# Patient Record
Sex: FEMALE | NOT HISPANIC OR LATINO | Employment: OTHER | URBAN - METROPOLITAN AREA
[De-identification: names, ages, dates, MRNs, and addresses within clinical notes are randomized per-mention and may not be internally consistent; named-entity substitution may affect disease eponyms.]

---

## 2019-04-17 ENCOUNTER — OFFICE VISIT (OUTPATIENT)
Dept: OBGYN | Facility: CLINIC | Age: 79
End: 2019-04-17
Payer: MEDICARE

## 2019-04-17 DIAGNOSIS — Z53.9 ERRONEOUS ENCOUNTER--DISREGARD: Primary | ICD-10-CM

## 2019-05-01 ENCOUNTER — OFFICE VISIT (OUTPATIENT)
Dept: FAMILY MEDICINE | Facility: CLINIC | Age: 79
End: 2019-05-01
Attending: FAMILY MEDICINE
Payer: MEDICARE

## 2019-05-01 VITALS
DIASTOLIC BLOOD PRESSURE: 85 MMHG | HEIGHT: 62 IN | BODY MASS INDEX: 26.81 KG/M2 | WEIGHT: 145.7 LBS | SYSTOLIC BLOOD PRESSURE: 151 MMHG | HEART RATE: 75 BPM

## 2019-05-01 DIAGNOSIS — E55.9 VITAMIN D DEFICIENCY: ICD-10-CM

## 2019-05-01 DIAGNOSIS — E78.00 HYPERCHOLESTEROLEMIA: ICD-10-CM

## 2019-05-01 DIAGNOSIS — R73.03 PREDIABETES: Primary | ICD-10-CM

## 2019-05-01 DIAGNOSIS — M47.26 OSTEOARTHRITIS OF SPINE WITH RADICULOPATHY, LUMBAR REGION: ICD-10-CM

## 2019-05-01 PROCEDURE — G0463 HOSPITAL OUTPT CLINIC VISIT: HCPCS | Mod: ZF

## 2019-05-01 RX ORDER — ATORVASTATIN CALCIUM 20 MG/1
20 TABLET, FILM COATED ORAL DAILY
Qty: 30 TABLET | Refills: 3 | Status: SHIPPED | OUTPATIENT
Start: 2019-05-01 | End: 2019-09-25

## 2019-05-01 RX ORDER — ATORVASTATIN CALCIUM 20 MG/1
20 TABLET, FILM COATED ORAL
COMMUNITY
Start: 2018-12-11 | End: 2019-06-26

## 2019-05-01 ASSESSMENT — MIFFLIN-ST. JEOR: SCORE: 1079.14

## 2019-05-01 NOTE — LETTER
Date:May 14, 2019      Patient was self referred, no letter generated. Do not send.        [facility] Information

## 2019-05-01 NOTE — PROGRESS NOTES
"Pt. Here to establish care    Seen with --(arrived late)--her insurance changed and could not be seen at former clinic  She has not been taking her medication since she was last seen there 3 months ago.  Counseled patient that can only address 3 concerns today and can continue on other health issues at next visit.      1. Pain in R leg  X 4 years, has seen many doctors and had multiple imaging in past, much of which has been [place] of state.  Pain starts  around R  Hips and radiates down thigh and leg to calf.  Increasingly,  cannot feel patch  on front of calf.   Describes pain as \"itching, poking, squeezing\" tingling? She has R leg weakness as well  She has low back pain, unclear if R leg pain starts at low back.   No pain with laying flat, worse with sitting, worse with walking on hard surface  Had same pain when was pregnant, and after childbirth, had difficulty lifting the right leg, gradually improved over time    Around time pain started had MRI back in [place]  Has had X-ray of back at ER in [place], records not available  Appears to have done PT with [facility] in 2018    2. Pain in both shoulders x years, getting worse-Connnective tissue disease labs (KERRI, CCP, negative per [facility] records)    3. Headaches x 6-7 years--had been on unknown medication in past   No change from previous HA's    Chronic medical problems  Hypercholesterol--had been on Lipitor per [facility] records  Elevated blood pressure without diagnosis of HTN--variable bp on review of [facility] records  PreDM with HgbA1 5.7, appears to have been referred to nutrition  Vit D deficinecy by record    Social History     Socioeconomic History     Marital status: Single     Spouse name: Not on file     Number of children: Not on file     Years of education: Not on file     Highest education level: Not on file   Occupational History     Not on file   Social Needs     Financial resource strain: Not on file     Food insecurity:     Worry: Not on file     Inability: Not " "on file     Transportation needs:     Medical: Not on file     Non-medical: Not on file   Tobacco Use     Smoking status: Not on file   Substance and Sexual Activity     Alcohol use: Not on file     Drug use: Not on file     Sexual activity: Not on file   Lifestyle     Physical activity:     Days per week: Not on file     Minutes per session: Not on file     Stress: Not on file   Relationships     Social connections:     Talks on phone: Not on file     Gets together: Not on file     Attends Zoroastrianism service: Not on file     Active member of club or organization: Not on file     Attends meetings of clubs or organizations: Not on file     Relationship status: Not on file     Intimate partner violence:     Fear of current or ex partner: Not on file     Emotionally abused: Not on file     Physically abused: Not on file     Forced sexual activity: Not on file   Other Topics Concern     Not on file   Social History Narrative     Not on file       ROS  As noted above    PE  /85 (BP Location: Right arm, Patient Position: Chair)   Pulse 75   Ht 1.575 m (5' 2\")   Wt 66.1 kg (145 lb 11.2 oz)   BMI 26.65 kg/m    GENERAL: no apparent distress  CARDIAC: regular rate and rhythm, no murmur  RESP: clear, no wheezing, no rales, no rhonchi  EXT--no edema  DTR 2/4 L patella, 1/4 R patella    Strength R hip flexor 5/5, L hip flexor 5/5  Lower leg R  5-/5 flex and ext, 5/5 for both L   dorsiflexion R foot 4-/5, plantarflexion 4/5, decreased strength with push to medial and lateral  Normal above on L foot    A/P  1. R leg pain, with weakness and loss sensatoin  2. Low back pain  Most likely radiculopathy, possible spinal stenosis  Will get MRI and refer to orthopedics vs PT depending on results    3 Hypercholesterol--restart Lipitor 20 mg  4. PreDM--repeat HgbA1c  5. HTN--CMP, will not start medication at this time given age and possible fall risk. Will consider if bp elevation persists at this level and gain better " understanding of home situatoin  6. Vit D deficiency--repeat level      Follow-up 1 month

## 2019-05-01 NOTE — LETTER
"5/1/2019       RE: Abdoulaye Peguero  [address]  [place] [address]     Dear Colleague,    Thank you for referring your patient, Abdoulaye Peguero, to the [facility] at [facility]. Please see a copy of my visit note below.    Pt. Here to establish care    Seen with --(arrived late)--her insurance changed and could not be seen at former clinic  She has not been taking her medication since she was last seen there 3 months ago.  Counseled patient that can only address 3 concerns today and can continue on other health issues at next visit.      1. Pain in R leg  X 4 years, has seen many doctors and had multiple imaging in past, much of which has been out of state.  Pain starts  around R  Hips and radiates down thigh and leg to calf.  Increasingly,  cannot feel patch  on front of calf.   Describes pain as \"itching, poking, squeezing\" tingling? She has R leg weakness as well  She has low back pain, unclear if R leg pain starts at low back.   No pain with laying flat, worse with sitting, worse with walking on hard surface  Had same pain when was pregnant, and after childbirth, had difficulty lifting the right leg, gradually improved over time    Around time pain started had [place] back in [place]  Has had X-ray of back at ER in [place], records not available  Appears to have done PT with [facility] in 2018    2. Pain in both shoulders x years, getting worse-Connnective tissue disease labs (KERRI, CCP, negative per [facility] records)    3. Headaches x 6-7 years--had been on unknown medication in past   No change from previous HA's    Chronic medical problems  Hypercholesterol--had been on Lipitor per [facility] records  Elevated blood pressure without diagnosis of HTN--variable bp on review of [facility] records  PreDM with HgbA1 5.7, appears to have been referred to nutrition  Vit D deficinecy by record    Social History     Socioeconomic History     Marital status: Single     Spouse " "name: Not on file     Number of children: Not on file     Years of education: Not on file     Highest education level: Not on file   Occupational History     Not on file   Social Needs     Financial resource strain: Not on file     Food insecurity:     Worry: Not on file     Inability: Not on file     Transportation needs:     Medical: Not on file     Non-medical: Not on file   Tobacco Use     Smoking status: Not on file   Substance and Sexual Activity     Alcohol use: Not on file     Drug use: Not on file     Sexual activity: Not on file   Lifestyle     Physical activity:     Days per week: Not on file     Minutes per session: Not on file     Stress: Not on file   Relationships     Social connections:     Talks on phone: Not on file     Gets together: Not on file     Attends Holiness service: Not on file     Active member of club or organization: Not on file     Attends meetings of clubs or organizations: Not on file     Relationship status: Not on file     Intimate partner violence:     Fear of current or ex partner: Not on file     Emotionally abused: Not on file     Physically abused: Not on file     Forced sexual activity: Not on file   Other Topics Concern     Not on file   Social History Narrative     Not on file       ROS  As noted above    PE  /85 (BP Location: Right arm, Patient Position: Chair)   Pulse 75   Ht 1.575 m (5' 2\")   Wt 66.1 kg (145 lb 11.2 oz)   BMI 26.65 kg/m     GENERAL: no apparent distress  CARDIAC: regular rate and rhythm, no murmur  RESP: clear, no wheezing, no rales, no rhonchi  EXT--no edema  DTR 2/4 L patella, 1/4 R patella    Strength R hip flexor 5/5, L hip flexor 5/5  Lower leg R  5-/5 flex and ext, 5/5 for both L   dorsiflexion R foot 4-/5, plantarflexion 4/5, decreased strength with push to medial and lateral  Normal above on L foot    A/P  1. R leg pain, with weakness and loss sensatoin  2. Low back pain  Most likely radiculopathy, possible spinal stenosis  Will get " MRI and refer to orthopedics vs PT depending on results    3 Hypercholesterol--restart Lipitor 20 mg  4. PreDM--repeat HgbA1c  5. HTN--CMP, will not start medication at this time given age and possible fall risk. Will consider if bp elevation persists at this level and gain better understanding of home situatoin  6. Vit D deficiency--repeat level      Follow-up 1 month      Again, thank you for allowing me to participate in the care of your patient.      Sincerely,    Sai Mcallister MD

## 2019-05-07 ENCOUNTER — HOSPITAL ENCOUNTER (OUTPATIENT)
Dept: MRI IMAGING | Facility: CLINIC | Age: 79
Discharge: HOME | End: 2019-05-07
Attending: FAMILY MEDICINE | Admitting: FAMILY MEDICINE
Payer: MEDICARE

## 2019-05-07 DIAGNOSIS — E78.00 HYPERCHOLESTEROLEMIA: ICD-10-CM

## 2019-05-07 DIAGNOSIS — M47.26 OSTEOARTHRITIS OF SPINE WITH RADICULOPATHY, LUMBAR REGION: ICD-10-CM

## 2019-05-07 DIAGNOSIS — R73.03 PREDIABETES: ICD-10-CM

## 2019-05-07 DIAGNOSIS — E55.9 VITAMIN D DEFICIENCY: ICD-10-CM

## 2019-05-07 LAB
ALBUMIN SERPL-MCNC: 3.5 G/DL (ref 3.4–5)
ALP SERPL-CCNC: 80 U/L (ref 40–150)
ALT SERPL W P-5'-P-CCNC: 27 U/L (ref 0–50)
ANION GAP SERPL CALCULATED.3IONS-SCNC: 7 MMOL/L (ref 3–14)
AST SERPL W P-5'-P-CCNC: 39 U/L (ref 0–45)
BILIRUB SERPL-MCNC: 0.2 MG/DL (ref 0.2–1.3)
BUN SERPL-MCNC: 14 MG/DL (ref 7–30)
CALCIUM SERPL-MCNC: 8.8 MG/DL (ref 8.5–10.1)
CHLORIDE SERPL-SCNC: 110 MMOL/L (ref 94–109)
CO2 SERPL-SCNC: 25 MMOL/L (ref 20–32)
CREAT SERPL-MCNC: 0.52 MG/DL (ref 0.52–1.04)
GFR SERPL CREATININE-BSD FRML MDRD: 89 ML/MIN/{1.73_M2}
GLUCOSE SERPL-MCNC: 102 MG/DL (ref 70–99)
HBA1C MFR BLD: 5.5 % (ref 0–5.6)
POTASSIUM SERPL-SCNC: 4.1 MMOL/L (ref 3.4–5.3)
PROT SERPL-MCNC: 7.6 G/DL (ref 6.8–8.8)
SODIUM SERPL-SCNC: 142 MMOL/L (ref 133–144)

## 2019-05-07 PROCEDURE — 80053 COMPREHEN METABOLIC PANEL: CPT | Performed by: FAMILY MEDICINE

## 2019-05-07 PROCEDURE — 72148 MRI LUMBAR SPINE W/O DYE: CPT

## 2019-05-07 PROCEDURE — 82306 VITAMIN D 25 HYDROXY: CPT | Performed by: FAMILY MEDICINE

## 2019-05-07 PROCEDURE — 83036 HEMOGLOBIN GLYCOSYLATED A1C: CPT | Performed by: FAMILY MEDICINE

## 2019-05-07 PROCEDURE — 36415 COLL VENOUS BLD VENIPUNCTURE: CPT | Performed by: FAMILY MEDICINE

## 2019-05-10 DIAGNOSIS — M47.26 OSTEOARTHRITIS OF SPINE WITH RADICULOPATHY, LUMBAR REGION: ICD-10-CM

## 2019-05-10 DIAGNOSIS — E55.9 VITAMIN D DEFICIENCY: Primary | ICD-10-CM

## 2019-05-10 PROBLEM — R73.03 PREDIABETES: Status: ACTIVE | Noted: 2018-02-27

## 2019-05-10 PROBLEM — R52 BODY ACHES: Status: ACTIVE | Noted: 2018-02-19

## 2019-05-10 PROBLEM — R32 URINARY INCONTINENCE: Status: ACTIVE | Noted: 2018-03-28

## 2019-05-10 PROBLEM — E78.5 HYPERLIPIDEMIA: Status: ACTIVE | Noted: 2018-04-04

## 2019-05-10 PROBLEM — K21.9 GASTROESOPHAGEAL REFLUX DISEASE: Status: ACTIVE | Noted: 2018-02-19

## 2019-05-10 LAB
DEPRECATED CALCIDIOL+CALCIFEROL SERPL-MC: 22 UG/L (ref 20–75)
VITAMIN D2 SERPL-MCNC: 6 UG/L
VITAMIN D3 SERPL-MCNC: 16 UG/L

## 2019-05-10 RX ORDER — ERGOCALCIFEROL 1.25 MG/1
50000 CAPSULE, LIQUID FILLED ORAL WEEKLY
Qty: 8 CAPSULE | Refills: 0 | Status: SHIPPED | OUTPATIENT
Start: 2019-05-10 | End: 2019-07-31

## 2019-05-10 NOTE — RESULT ENCOUNTER NOTE
Please call with results. Pt. Does not speak English--speaks [place].  MRI shows arthritis in her lower (lumbar spine) and changes are pressing on nerves which can affect her legs. I will refer her to orthopedist. Please help get this referral set up for her. Also is vitamin D deficient--I sent in prescription for Vitamin d, 1 tab WEEKLY for 8 weeks.      Sai Mcallister MD

## 2019-06-04 NOTE — TELEPHONE ENCOUNTER
RECORDS RECEIVED FROM: Osteoarthritis of spine, imaging in system, per pt and . Referred by Sai Mcallister MD   DATE RECEIVED: Jun 11, 2019    NOTES STATUS DETAILS   OFFICE NOTE from referring provider Internal Dr. Mcallister 5/1/19   OFFICE NOTE from other specialist N/A    DISCHARGE SUMMARY from hospital N/A    DISCHARGE REPORT from the ER N/A    OPERATIVE REPORT N/A    MEDICATION LIST Internal    IMPLANT RECORD/STICKER N/A    LABS     CBC/DIFF N/A    CULTURES N/A    INJECTIONS DONE IN RADIOLOGY N/A    MRI Internal 5/7/19   CT SCAN N/A    XRAYS (IMAGES & REPORTS) N/A    TUMOR     PATHOLOGY  Slides & report N/A

## 2019-06-11 ENCOUNTER — PRE VISIT (OUTPATIENT)
Dept: ORTHOPEDICS | Facility: CLINIC | Age: 79
End: 2019-06-11

## 2019-06-11 ENCOUNTER — ANCILLARY PROCEDURE (OUTPATIENT)
Dept: GENERAL RADIOLOGY | Facility: CLINIC | Age: 79
End: 2019-06-11
Attending: PREVENTIVE MEDICINE
Payer: MEDICARE

## 2019-06-11 ENCOUNTER — OFFICE VISIT (OUTPATIENT)
Dept: ORTHOPEDICS | Facility: CLINIC | Age: 79
End: 2019-06-11
Attending: FAMILY MEDICINE
Payer: MEDICARE

## 2019-06-11 DIAGNOSIS — M50.30 DDD (DEGENERATIVE DISC DISEASE), CERVICAL: Primary | ICD-10-CM

## 2019-06-11 DIAGNOSIS — M50.30 DDD (DEGENERATIVE DISC DISEASE), CERVICAL: ICD-10-CM

## 2019-06-11 DIAGNOSIS — M51.369 DDD (DEGENERATIVE DISC DISEASE), LUMBAR: ICD-10-CM

## 2019-06-11 RX ORDER — METHYLPREDNISOLONE 4 MG
TABLET, DOSE PACK ORAL
Qty: 21 TABLET | Refills: 0 | Status: SHIPPED | OUTPATIENT
Start: 2019-06-11 | End: 2019-06-26

## 2019-06-11 NOTE — PROGRESS NOTES
HISTORY OF PRESENT ILLNESS  Ms. Peguero is a pleasant 79 year old year old female who presents to clinic today with bilateral shoulder pain and right arm pain and neck pain that has occurred for 3 years   And low back pain that radiates into both legs for the past few months  On/off  Would like do something now about the neck    MEDICAL HISTORY  Patient Active Problem List   Diagnosis     Chronic headache     Gastroesophageal reflux disease     Hyperlipidemia     Prediabetes     Urinary incontinence     Vitamin D deficiency     Body aches       Current Outpatient Medications   Medication Sig Dispense Refill     atorvastatin (LIPITOR) 20 MG tablet Take 20 mg by mouth       atorvastatin (LIPITOR) 20 MG tablet Take 1 tablet (20 mg) by mouth daily 30 tablet 3     vitamin D2 (ERGOCALCIFEROL) 71334 units (1250 mcg) capsule Take 1 capsule (50,000 Units) by mouth once a week for 8 doses 8 capsule 0       No Known Allergies    No family history on file.    Additional medical/Social/Surgical histories reviewed in [facility] and updated as appropriate.     REVIEW OF SYSTEMS (6/11/2019)  10 point ROS of systems including Constitutional, Eyes, Respiratory, Cardiovascular, Gastroenterology, Genitourinary, Integumentary, Musculoskeletal, Psychiatric were all negative except for pertinent positives noted in my HPI.     PHYSICAL EXAM  VSS, reviewed  Vital Signs: There were no vitals taken for this visit. Patient declined being weighed. There is no height or weight on file to calculate BMI.    General  - normal appearance, in no obvious distress  CV  - normal peripheral perfusion  Pulm  - normal respiratory pattern, non-labored  Musculoskeletal - lumbar spine  - stance: normal gait without limp, no obvious leg length discrepancy, normal heel and toe walk  - inspection: normal bone and joint alignment, no obvious scoliosis  - palpation: no paravertebral or bony tenderness  - ROM: flexion exacerbates pain, normal extension, sidebending,  rotation  - strength: lower extremities 5/5 in all planes  - special tests:  (-) straight leg raise  (-) slump test  Neuro  - patellar and Achilles DTRs 2+ bilaterally, NO lower extremity sensory deficit throughout L5 distribution, grossly normal coordination, normal muscle tone  Skin  - no ecchymosis, erythema, warmth, or induration, no obvious rash  Psych  - interactive, appropriate, normal mood and affect  Cervical: has pain with flexion and extension in neck  Has some radiation into arms/shoulders  ASSESSMENT & PLAN  78 yo female with cervical ddd, lumbar ddd  Reviewed xrays lumbar and cervical :shows ddd  Ordered cervical MRI  Will consider KIM  Cont. Medrol and tizanadine PRN        Jarrod Yadav MD, CAQSM

## 2019-06-11 NOTE — LETTER
6/11/2019       RE: Abdoulaye ePguero  [address]  [place] [address]     Dear Colleague,    Thank you for referring your patient, Abdoulaye Peguero, to the [facility] at [facility]. Please see a copy of my visit note below.    HISTORY OF PRESENT ILLNESS  Ms. Peguero is a pleasant 79 year old year old female who presents to clinic today with bilateral shoulder pain and right arm pain and neck pain that has occurred for 3 years   And low back pain that radiates into both legs for the past few months  On/off  Would like do something now about the neck    MEDICAL HISTORY  Patient Active Problem List   Diagnosis     Chronic headache     Gastroesophageal reflux disease     Hyperlipidemia     Prediabetes     Urinary incontinence     Vitamin D deficiency     Body aches       Current Outpatient Medications   Medication Sig Dispense Refill     atorvastatin (LIPITOR) 20 MG tablet Take 20 mg by mouth       atorvastatin (LIPITOR) 20 MG tablet Take 1 tablet (20 mg) by mouth daily 30 tablet 3     vitamin D2 (ERGOCALCIFEROL) 21460 units (1250 mcg) capsule Take 1 capsule (50,000 Units) by mouth once a week for 8 doses 8 capsule 0       No Known Allergies    No family history on file.    Additional medical/Social/Surgical histories reviewed in [employer] and updated as appropriate.     REVIEW OF SYSTEMS (6/11/2019)  10 point ROS of systems including Constitutional, Eyes, Respiratory, Cardiovascular, Gastroenterology, Genitourinary, Integumentary, Musculoskeletal, Psychiatric were all negative except for pertinent positives noted in my HPI.     PHYSICAL EXAM  VSS, reviewed  Vital Signs: There were no vitals taken for this visit. Patient declined being weighed. There is no height or weight on file to calculate BMI.    General  - normal appearance, in no obvious distress  CV  - normal peripheral perfusion  Pulm  - normal respiratory pattern, non-labored  Musculoskeletal - lumbar spine  -  stance: normal gait without limp, no obvious leg length discrepancy, normal heel and toe walk  - inspection: normal bone and joint alignment, no obvious scoliosis  - palpation: no paravertebral or bony tenderness  - ROM: flexion exacerbates pain, normal extension, sidebending, rotation  - strength: lower extremities 5/5 in all planes  - special tests:  (-) straight leg raise  (-) slump test  Neuro  - patellar and Achilles DTRs 2+ bilaterally, NO lower extremity sensory deficit throughout L5 distribution, grossly normal coordination, normal muscle tone  Skin  - no ecchymosis, erythema, warmth, or induration, no obvious rash  Psych  - interactive, appropriate, normal mood and affect  Cervical: has pain with flexion and extension in neck  Has some radiation into arms/shoulders  ASSESSMENT & PLAN  78 yo female with cervical ddd, lumbar ddd  Reviewed xrays lumbar and cervical :shows ddd  Ordered cervical MRI  Will consider KIM  Cont. Medrol and tizanadine PRN    Jarrod Yadav MD, CAQSM

## 2019-06-11 NOTE — NURSING NOTE
Reason For Visit:   Chief Complaint   Patient presents with     Spine - Pain, Eval/Assessment       There were no vitals taken for this visit.    Pain Assessment  Patient Currently in Pain: Yes  0-10 Pain Scale: 5  Primary Pain Location: Back(mid)  Other Pain Locations: both legs    Ingrid Armenta, ATC

## 2019-06-24 ENCOUNTER — ANCILLARY PROCEDURE (OUTPATIENT)
Dept: MRI IMAGING | Facility: CLINIC | Age: 79
End: 2019-06-24
Attending: PREVENTIVE MEDICINE
Payer: MEDICARE

## 2019-06-24 DIAGNOSIS — M50.30 DDD (DEGENERATIVE DISC DISEASE), CERVICAL: ICD-10-CM

## 2019-06-26 ENCOUNTER — OFFICE VISIT (OUTPATIENT)
Dept: FAMILY MEDICINE | Facility: CLINIC | Age: 79
End: 2019-06-26
Attending: FAMILY MEDICINE
Payer: MEDICARE

## 2019-06-26 VITALS
WEIGHT: 146 LBS | SYSTOLIC BLOOD PRESSURE: 124 MMHG | HEART RATE: 78 BPM | DIASTOLIC BLOOD PRESSURE: 79 MMHG | BODY MASS INDEX: 26.7 KG/M2

## 2019-06-26 DIAGNOSIS — Z11.1 SCREENING EXAMINATION FOR PULMONARY TUBERCULOSIS: ICD-10-CM

## 2019-06-26 DIAGNOSIS — M51.369 DDD (DEGENERATIVE DISC DISEASE), LUMBAR: ICD-10-CM

## 2019-06-26 DIAGNOSIS — M47.9 DEGENERATIVE JOINT DISEASE OF LOW BACK: ICD-10-CM

## 2019-06-26 DIAGNOSIS — E55.9 VITAMIN D DEFICIENCY: ICD-10-CM

## 2019-06-26 DIAGNOSIS — F32.A DEPRESSION, UNSPECIFIED DEPRESSION TYPE: Primary | ICD-10-CM

## 2019-06-26 DIAGNOSIS — E78.00 HYPERCHOLESTEROLEMIA: ICD-10-CM

## 2019-06-26 DIAGNOSIS — M47.25 OSTEOARTHRITIS OF SPINE WITH RADICULOPATHY, THORACOLUMBAR REGION: ICD-10-CM

## 2019-06-26 DIAGNOSIS — K21.9 GASTROESOPHAGEAL REFLUX DISEASE, ESOPHAGITIS PRESENCE NOT SPECIFIED: ICD-10-CM

## 2019-06-26 DIAGNOSIS — M48.062 SPINAL STENOSIS OF LUMBAR REGION WITH NEUROGENIC CLAUDICATION: ICD-10-CM

## 2019-06-26 LAB
CHOLEST SERPL-MCNC: 227 MG/DL
ERYTHROCYTE [DISTWIDTH] IN BLOOD BY AUTOMATED COUNT: 12.7 % (ref 10–15)
HCT VFR BLD AUTO: 36.7 % (ref 35–47)
HDLC SERPL-MCNC: 61 MG/DL
HGB BLD-MCNC: 11.5 G/DL (ref 11.7–15.7)
LDLC SERPL CALC-MCNC: 131 MG/DL
MCH RBC QN AUTO: 30.3 PG (ref 26.5–33)
MCHC RBC AUTO-ENTMCNC: 31.3 G/DL (ref 31.5–36.5)
MCV RBC AUTO: 97 FL (ref 78–100)
NONHDLC SERPL-MCNC: 166 MG/DL
PLATELET # BLD AUTO: 275 10E9/L (ref 150–450)
RBC # BLD AUTO: 3.8 10E12/L (ref 3.8–5.2)
TRIGL SERPL-MCNC: 175 MG/DL
TSH SERPL DL<=0.005 MIU/L-ACNC: 0.68 MU/L (ref 0.4–4)
WBC # BLD AUTO: 8.2 10E9/L (ref 4–11)

## 2019-06-26 PROCEDURE — 84443 ASSAY THYROID STIM HORMONE: CPT | Performed by: FAMILY MEDICINE

## 2019-06-26 PROCEDURE — 36415 COLL VENOUS BLD VENIPUNCTURE: CPT | Performed by: FAMILY MEDICINE

## 2019-06-26 PROCEDURE — 85027 COMPLETE CBC AUTOMATED: CPT | Performed by: FAMILY MEDICINE

## 2019-06-26 PROCEDURE — G0463 HOSPITAL OUTPT CLINIC VISIT: HCPCS | Mod: ZF

## 2019-06-26 PROCEDURE — 86481 TB AG RESPONSE T-CELL SUSP: CPT | Performed by: FAMILY MEDICINE

## 2019-06-26 PROCEDURE — 80061 LIPID PANEL: CPT | Performed by: FAMILY MEDICINE

## 2019-06-26 RX ORDER — SENNOSIDES 8.6 MG
650 CAPSULE ORAL 3 TIMES DAILY
Qty: 90 TABLET | Refills: 11 | Status: SHIPPED | OUTPATIENT
Start: 2019-06-26 | End: 2020-08-26

## 2019-06-26 RX ORDER — CHOLECALCIFEROL (VITAMIN D3) 50 MCG
1 TABLET ORAL DAILY
Qty: 90 TABLET | Refills: 3 | Status: SHIPPED | OUTPATIENT
Start: 2019-06-26 | End: 2022-02-04

## 2019-06-26 SDOH — HEALTH STABILITY: MENTAL HEALTH: HOW OFTEN DO YOU HAVE A DRINK CONTAINING ALCOHOL?: NEVER

## 2019-06-26 SDOH — HEALTH STABILITY: MENTAL HEALTH: HOW OFTEN DO YOU HAVE 6 OR MORE DRINKS ON ONE OCCASION?: NEVER

## 2019-06-26 ASSESSMENT — PAIN SCALES - GENERAL: PAINLEVEL: NO PAIN (0)

## 2019-06-26 NOTE — LETTER
"6/26/2019       RE: Abdoulaye Peguero  [address]  [place] [address]     Dear Colleague,    Thank you for referring your patient, Abdoulaye Peguero, to the [facility] at [facility]. Please see a copy of my visit note below.    Pt. Here for continued care, multiple concerns  Seen with     1. Chronic back and neck pain  Saw Orthopedist on 6/11/2019. Cervical spine Xray and MRI cerical spine completed, consistent with degenerative disc disease, facet arthropathy and spinal stenosis . Orthopedic note not complete and patient has follow up 7/16    2.  Post prandial weakness: When eat , states feels weak and doesn't have any energy--will happen right away, food feels like sits in stomach. No nausea or vomiting, just weak. Lasts until has bm, \"until has digested\" Will last from lunch until evening. Have bm every other day  3. Decreeased vision, floaters has opthalmology appointment   4. Decreased hearing in L ear, had past injury or incident with doctor in past   5. Lipids--not taking lipitor, wanted to review labs would like repeat it before takes  6. Low Vitamin D: Reviewed labs w/patient--needs Vit D supplement  7. Will Be attending adult day care, need labs       Current Outpatient Medications   Medication     tiZANidine (ZANAFLEX) 4 MG tablet     vitamin D2 (ERGOCALCIFEROL) 84756 units (1250 mcg) capsule     atorvastatin (LIPITOR) 20 MG tablet     No current facility-administered medications for this visit.        SH  Social History     Socioeconomic History     Marital status: Single     Spouse name: Not on file     Number of children: Not on file     Years of education: Not on file     Highest education level: Not on file   Occupational History     Not on file   Social Needs     Financial resource strain: Not on file     Food insecurity:     Worry: Not on file     Inability: Not on file     Transportation needs:     Medical: Not on file     " Non-medical: Not on file   Tobacco Use     Smoking status: Never Smoker     Smokeless tobacco: Never Used   Substance and Sexual Activity     Alcohol use: Never     Frequency: Never     Binge frequency: Never     Drug use: Never     Sexual activity: Not Currently     Partners: Male     Birth control/protection: Post-menopausal   Lifestyle     Physical activity:     Days per week: Not on file     Minutes per session: Not on file     Stress: Not on file   Relationships     Social connections:     Talks on phone: Not on file     Gets together: Not on file     Attends Hoahaoism service: Not on file     Active member of club or organization: Not on file     Attends meetings of clubs or organizations: Not on file     Relationship status: Not on file     Intimate partner violence:     Fear of current or ex partner: Not on file     Emotionally abused: Not on file     Physically abused: Not on file     Forced sexual activity: Not on file   Other Topics Concern     Not on file   Social History Narrative     Not on file     Live alone  Brother, 2nd cousin, grandchildren in [facility]  Children live in different states  Someone comes to cook for pt., do laundry, clean house.      ROS  + Reflux  Dyspnea with exertion, any movement, eating  No chest pain, palpitations  +Gas  Mood: Sometimes nervous, sometimes happy, more easily angry  Occasional  issue with short term memory--can't remember where put phone, for example  12 point ROS negative except where noted above       PE:  Blood pressure 124/79, pulse 78, weight 66.2 kg (146 lb), not currently breastfeeding.    /79   Pulse 78   Wt 66.2 kg (146 lb)   Breastfeeding? No   BMI 26.70 kg/m     GENERAL: no apparent distress, speaks in full sentences  EYES: Conjunctiva are not injected, no discharge.  EARS: Left TM -no erythema, no effusion,  not bulged.               Right TM -no erythema, no effusion,  not bulged.  THROAT: no erythema, no exudate, no lesions  NECK:  "supple, no adenopathy.  CARDIAC: regular rate and rhythm, no murmur  RESP: clear, no wheezing, no rales, no rhonchi  ABD: soft, no distension, no tenderness, no masses, no liver or spleen enlargement  SKIN: No rashes    A/P  1. Chronic neck and back pain due to structural disease (DDD, formainal stenosis, OA)  Continue with orthopedics, start acetominphen  mg tid  2. Postprandial \"weakness\", early satiety  3. GERD  --start Prilosec 20 mg daily  --Consider GI referral, mesenteric ischemia?  4. Repeat lipids, restart lipitor if indicated  5. Blood pressure--currently normotensive,no need for medicatoin  6. Dyspnea--unclear if fatigue or dyspnea  Check CBC, TSH  Consider EKG,Echo  7. Need [facility] for adult day care  8. Mental health: counseled patient extensively regarding mood, treatment for low mood, including medication and therapy Patient denies SI. She is open to both treatments. Will refer to both therapy and psychiatry, given age would like input of psychiatrist.      Review immunization and cancer screening next visit  Follow-up 2-3 months      Again, thank you for allowing me to participate in the care of your patient.      Sincerely,    Sai Mcallister MD      "

## 2019-06-26 NOTE — LETTER
Date:July 8, 2019      Patient was self referred, no letter generated. Do not send.        [facility] Information

## 2019-06-26 NOTE — PROGRESS NOTES
"Pt. Here for continued care, multiple concerns  Seen with     1. Chronic back and neck pain  Saw Orthopedist on 6/11/2019. Cervical spine Xray and MRI cerical spine completed, consistent with degenerative disc disease, facet arthropathy and spinal stenosis . Orthopedic note not complete and patient has follow up 7/16    2.  Post prandial weakness: When eat , states feels weak and doesn't have any energy--will happen right away, food feels like sits in stomach. No nausea or vomiting, just weak. Lasts until has bm, \"until has digested\" Will last from lunch until evening. Have bm every other day  3. Decreeased vision, floaters has opthalmology appointment   4. Decreased hearing in L ear, had past injury or incident with doctor in past   5. Lipids--not taking lipitor, wanted to review labs would like repeat it before takes  6. Low Vitamin D: Reviewed labs w/patient--needs Vit D supplement  7. Will Be attending adult day care, need labs       Current Outpatient Medications   Medication     tiZANidine (ZANAFLEX) 4 MG tablet     vitamin D2 (ERGOCALCIFEROL) 70024 units (1250 mcg) capsule     atorvastatin (LIPITOR) 20 MG tablet     No current facility-administered medications for this visit.        SH  Social History     Socioeconomic History     Marital status: Single     Spouse name: Not on file     Number of children: Not on file     Years of education: Not on file     Highest education level: Not on file   Occupational History     Not on file   Social Needs     Financial resource strain: Not on file     Food insecurity:     Worry: Not on file     Inability: Not on file     Transportation needs:     Medical: Not on file     Non-medical: Not on file   Tobacco Use     Smoking status: Never Smoker     Smokeless tobacco: Never Used   Substance and Sexual Activity     Alcohol use: Never     Frequency: Never     Binge frequency: Never     Drug use: Never     Sexual activity: Not Currently     Partners: Male     Birth " control/protection: Post-menopausal   Lifestyle     Physical activity:     Days per week: Not on file     Minutes per session: Not on file     Stress: Not on file   Relationships     Social connections:     Talks on phone: Not on file     Gets together: Not on file     Attends Baptism service: Not on file     Active member of club or organization: Not on file     Attends meetings of clubs or organizations: Not on file     Relationship status: Not on file     Intimate partner violence:     Fear of current or ex partner: Not on file     Emotionally abused: Not on file     Physically abused: Not on file     Forced sexual activity: Not on file   Other Topics Concern     Not on file   Social History Narrative     Not on file     Live alone  Brother, 2nd cousin, grandchildren in [facility]  Children live in different states  Someone comes to cook for pt., do laundry, clean house.      ROS  + Reflux  Dyspnea with exertion, any movement, eating  No chest pain, palpitations  +Gas  Mood: Sometimes nervous, sometimes happy, more easily angry  Occasional  issue with short term memory--can't remember where put phone, for example  12 point ROS negative except where noted above       PE:  Blood pressure 124/79, pulse 78, weight 66.2 kg (146 lb), not currently breastfeeding.    /79   Pulse 78   Wt 66.2 kg (146 lb)   Breastfeeding? No   BMI 26.70 kg/m    GENERAL: no apparent distress, speaks in full sentences  EYES: Conjunctiva are not injected, no discharge.  EARS: Left TM -no erythema, no effusion,  not bulged.               Right TM -no erythema, no effusion,  not bulged.  THROAT: no erythema, no exudate, no lesions  NECK: supple, no adenopathy.  CARDIAC: regular rate and rhythm, no murmur  RESP: clear, no wheezing, no rales, no rhonchi  ABD: soft, no distension, no tenderness, no masses, no liver or spleen enlargement  SKIN: No rashes    A/P  1. Chronic neck and back pain due to structural disease (DDD, formainal  "stenosis, OA)  Continue with orthopedics, start acetominphen  mg tid  2. Postprandial \"weakness\", early satiety  3. GERD  --start Prilosec 20 mg daily  --Consider GI referral, mesenteric ischemia?  4. Repeat lipids, restart lipitor if indicated  5. Blood pressure--currently normotensive,no need for medicatoin  6. Dyspnea--unclear if fatigue or dyspnea  Check CBC, TSH  Consider EKG,Echo  7. Need [facility] for adult day care  8. Mental health: counseled patient extensively regarding mood, treatment for low mood, including medication and therapy Patient denies SI. She is open to both treatments. Will refer to both therapy and psychiatry, given age would like input of psychiatrist.      Review immunization and cancer screening next visit  Follow-up 2-3 months  "

## 2019-06-28 LAB
GAMMA INTERFERON BACKGROUND BLD IA-ACNC: 0.06 IU/ML
M TB IFN-G BLD-IMP: NEGATIVE
M TB IFN-G CD4+ BCKGRND COR BLD-ACNC: 9.54 IU/ML
MITOGEN IGNF BCKGRD COR BLD-ACNC: 0 IU/ML
MITOGEN IGNF BCKGRD COR BLD-ACNC: 0 IU/ML

## 2019-07-05 PROBLEM — M51.369 DDD (DEGENERATIVE DISC DISEASE), LUMBAR: Status: ACTIVE | Noted: 2019-07-05

## 2019-07-05 PROBLEM — M48.062 SPINAL STENOSIS OF LUMBAR REGION WITH NEUROGENIC CLAUDICATION: Status: ACTIVE | Noted: 2019-07-05

## 2019-07-05 PROBLEM — M47.25 OSTEOARTHRITIS OF SPINE WITH RADICULOPATHY, THORACOLUMBAR REGION: Status: ACTIVE | Noted: 2019-07-05

## 2019-07-05 PROBLEM — M48.062 SPINAL STENOSIS OF LUMBAR REGION WITH NEUROGENIC CLAUDICATION: Status: RESOLVED | Noted: 2019-07-05 | Resolved: 2019-07-05

## 2019-07-16 ENCOUNTER — OFFICE VISIT (OUTPATIENT)
Dept: ORTHOPEDICS | Facility: CLINIC | Age: 79
End: 2019-07-16
Payer: MEDICARE

## 2019-07-16 DIAGNOSIS — M51.369 DDD (DEGENERATIVE DISC DISEASE), LUMBAR: ICD-10-CM

## 2019-07-16 DIAGNOSIS — M50.30 DDD (DEGENERATIVE DISC DISEASE), CERVICAL: Primary | ICD-10-CM

## 2019-07-16 NOTE — NURSING NOTE
Reason For Visit:   Chief Complaint   Patient presents with     Spine - RECHECK       There were no vitals taken for this visit.    Pain Assessment  Patient Currently in Pain: Yes  0-10 Pain Scale: 5    Ingrid Armenta, ATC

## 2019-07-16 NOTE — PROGRESS NOTES
HISTORY OF PRESENT ILLNESS  Ms. Peguero is a pleasant 79 year old year old female who presents to clinic today for followup for cervical MRI  Doing ok  Feels a little better    MEDICAL HISTORY  Patient Active Problem List   Diagnosis     Chronic headache     Gastroesophageal reflux disease     Hyperlipidemia     Prediabetes     Urinary incontinence     Vitamin D deficiency     Body aches     DDD (degenerative disc disease), lumbar     Osteoarthritis of spine with radiculopathy, thoracolumbar region       Current Outpatient Medications   Medication Sig Dispense Refill     acetaminophen (TYLENOL) 650 MG CR tablet Take 1 tablet (650 mg) by mouth 3 times daily 90 tablet 11     atorvastatin (LIPITOR) 20 MG tablet Take 1 tablet (20 mg) by mouth daily 30 tablet 3     omeprazole (PRILOSEC) 20 MG DR capsule Take 1 capsule (20 mg) by mouth daily 90 capsule 1     tiZANidine (ZANAFLEX) 4 MG tablet Take 1-2 tablets (4-8 mg) by mouth nightly as needed 30 tablet 1     vitamin D3 (CHOLECALCIFEROL) 2000 units (50 mcg) tablet Take 1 tablet (2,000 Units) by mouth daily Take one tablet daily. 90 tablet 3       No Known Allergies    No family history on file.    Additional medical/Social/Surgical histories reviewed in [facility] and updated as appropriate.     REVIEW OF SYSTEMS (7/16/2019)  10 point ROS of systems including Constitutional, Eyes, Respiratory, Cardiovascular, Gastroenterology, Genitourinary, Integumentary, Musculoskeletal, Psychiatric were all negative except for pertinent positives noted in my HPI.     PHYSICAL EXAM  VSS  Vital Signs: There were no vitals taken for this visit. Patient declined being weighed. There is no height or weight on file to calculate BMI.    General  - normal appearance, in no obvious distress  CV  - normal radial pulse  Pulm  - normal respiratory pattern, non-labored  Musculoskeletal - neck  - inspection: normal bone and joint alignment, no obvious deformity, no scapular winging, no AC step-off  -  palpation: no bony or soft tissue tenderness, normal clavicle, non-tender AC  - ROM:  Has full ROM of neck with minimal pain  - strength: 5/5  strength, 5/5 in all shoulder planes    Neuro  - no sensory or motor deficit, grossly normal coordination, normal muscle tone  Skin  - no ecchymosis, erythema, warmth, or induration, no obvious rash  Psych  - interactive, appropriate, normal mood and affect  Lumbar: has some pain with flexion and extension, negative SLR    ASSESSMENT & PLAN  80 yo female with lumbar and cervical ddd, improved  tizanadine RX  Start PT  F/u in 1 month      Jarrod Yadav MD, CAQSM

## 2019-07-16 NOTE — LETTER
7/16/2019       RE: Abdoulaye Peguero  [address]  [facility] [address]     Dear Colleague,    Thank you for referring your patient, Abdoulaye Peguero, [facility]. Please see a copy of my visit note below.    HISTORY OF PRESENT ILLNESS  Ms. Peguero is a pleasant 79 year old year old female who presents to clinic today for followup for cervical MRI  Doing ok  Feels a little better    MEDICAL HISTORY  Patient Active Problem List   Diagnosis     Chronic headache     Gastroesophageal reflux disease     Hyperlipidemia     Prediabetes     Urinary incontinence     Vitamin D deficiency     Body aches     DDD (degenerative disc disease), lumbar     Osteoarthritis of spine with radiculopathy, thoracolumbar region       Current Outpatient Medications   Medication Sig Dispense Refill     acetaminophen (TYLENOL) 650 MG CR tablet Take 1 tablet (650 mg) by mouth 3 times daily 90 tablet 11     atorvastatin (LIPITOR) 20 MG tablet Take 1 tablet (20 mg) by mouth daily 30 tablet 3     omeprazole (PRILOSEC) 20 MG DR capsule Take 1 capsule (20 mg) by mouth daily 90 capsule 1     tiZANidine (ZANAFLEX) 4 MG tablet Take 1-2 tablets (4-8 mg) by mouth nightly as needed 30 tablet 1     vitamin D3 (CHOLECALCIFEROL) 2000 units (50 mcg) tablet Take 1 tablet (2,000 Units) by mouth daily Take one tablet daily. 90 tablet 3       No Known Allergies    No family history on file.    Additional medical/Social/Surgical histories reviewed in [facility] and updated as appropriate.     REVIEW OF SYSTEMS (7/16/2019)  10 point ROS of systems including Constitutional, Eyes, Respiratory, Cardiovascular, Gastroenterology, Genitourinary, Integumentary, Musculoskeletal, Psychiatric were all negative except for pertinent positives noted in my HPI.     PHYSICAL EXAM  VSS  Vital Signs: There were no vitals taken for this visit. Patient declined being weighed. There is no height or weight on file to  calculate BMI.    General  - normal appearance, in no obvious distress  CV  - normal radial pulse  Pulm  - normal respiratory pattern, non-labored  Musculoskeletal - neck  - inspection: normal bone and joint alignment, no obvious deformity, no scapular winging, no AC step-off  - palpation: no bony or soft tissue tenderness, normal clavicle, non-tender AC  - ROM:  Has full ROM of neck with minimal pain  - strength: 5/5  strength, 5/5 in all shoulder planes    Neuro  - no sensory or motor deficit, grossly normal coordination, normal muscle tone  Skin  - no ecchymosis, erythema, warmth, or induration, no obvious rash  Psych  - interactive, appropriate, normal mood and affect  Lumbar: has some pain with flexion and extension, negative SLR    ASSESSMENT & PLAN  80 yo female with lumbar and cervical ddd, improved  tizanadine RX  Start PT  F/u in 1 month      Jarrod Yadav MD, CAQSM

## 2019-07-21 ENCOUNTER — HOSPITAL ENCOUNTER (EMERGENCY)
Facility: CLINIC | Age: 79
Discharge: HOME | End: 2019-07-21
Attending: EMERGENCY MEDICINE | Admitting: EMERGENCY MEDICINE
Payer: MEDICARE

## 2019-07-21 VITALS
RESPIRATION RATE: 18 BRPM | HEART RATE: 66 BPM | SYSTOLIC BLOOD PRESSURE: 129 MMHG | DIASTOLIC BLOOD PRESSURE: 92 MMHG | TEMPERATURE: 98.1 F | OXYGEN SATURATION: 99 %

## 2019-07-21 DIAGNOSIS — S05.02XA ABRASION OF LEFT CORNEA, INITIAL ENCOUNTER: ICD-10-CM

## 2019-07-21 PROCEDURE — 99283 EMERGENCY DEPT VISIT LOW MDM: CPT | Performed by: EMERGENCY MEDICINE

## 2019-07-21 PROCEDURE — 99284 EMERGENCY DEPT VISIT MOD MDM: CPT | Mod: Z6 | Performed by: EMERGENCY MEDICINE

## 2019-07-21 PROCEDURE — 99281 EMR DPT VST MAYX REQ PHY/QHP: CPT

## 2019-07-21 PROCEDURE — 25000125 ZZHC RX 250

## 2019-07-21 RX ORDER — PROPARACAINE HYDROCHLORIDE 5 MG/ML
SOLUTION/ DROPS OPHTHALMIC
Status: DISCONTINUED
Start: 2019-07-21 | End: 2019-07-21 | Stop reason: HOSPADM

## 2019-07-21 RX ORDER — MOXIFLOXACIN 5 MG/ML
1 SOLUTION/ DROPS OPHTHALMIC 4 TIMES DAILY
Qty: 3 ML | Refills: 0 | Status: SHIPPED | OUTPATIENT
Start: 2019-07-21 | End: 2022-02-04

## 2019-07-21 ASSESSMENT — ENCOUNTER SYMPTOMS
EYE DISCHARGE: 0
HEADACHES: 1
PHOTOPHOBIA: 1
EYE PAIN: 1

## 2019-07-21 NOTE — CONSULTS
OPHTHALMOLOGY CONSULT NOTE  07/21/19    Patient: Abdoulaye Peguero  Consulted by: Dr. Hein  Reason for Consult: Red, painful left eye    HISTORY OF PRESENTING ILLNESS:     Abdoulaye Peguero is a 79 year old female with arthritis who presents with left eye pain and redness for 1 week.  She states that she had a similar episode in 2005.  Although she doesn't remember the cause, she knows it resolved with drops.  Over the past week, she has had increasing eye pain with blinking, foreign body sensation, photophobia, and tearing in her left eye.  Her right eye is unaffected, although she notes having a floater in the right eye which she has scheduled follow up for on Tuesday.  She denies any trauma to the eyes, no history of ocular surgery.  She does not wear contact lenses.  No recent illnesses.  Negative history of glaucoma.        Review of systems were otherwise negative except for that which has been stated above.      OCULAR/MEDICAL/SURGICAL HISTORIES:     Past Ocular History:  - 1 episode of eye pain in 2005; unknown etiology  - History of episcleritis    History reviewed. No pertinent past medical history.    History reviewed. No pertinent surgical history.    EXAMINATION:     Base Eye Exam     Visual Acuity (Snellen - Linear)       Right Left    Near cc 20/40 20/40    Correction:  Glasses          Tonometry (Tonopen, 5:04 PM)       Right Left    Pressure 16 15          Pupils       Pupils Dark Light Shape React APD    Right PERRL 6 4 Round Brisk None    Left PERRL 6 4 Round Brisk None          Visual Fields       Left Right     Full Full          Extraocular Movement       Right Left     Full Full          Neuro/Psych     Oriented x3:  Yes            Additional Tests     Color       Right Left    Ishihara 11/11 11/11            Slit Lamp and Fundus Exam     External Exam       Right Left    External Normal Normal          Slit Lamp Exam       Right Left    Lids/Lashes MGD, blepharitis MGD, blepharitis     Conjunctiva/Sclera White and quiet 2-3+ Injection    Cornea Old scar visible 4:00 in periphery. Arcus , 1+ Punctate epithelial erosions 1.2 mm x 1.0 mm paracentral corneal abrasion with epi defect, mild surrounding corneal haze.  Arcus    Anterior Chamber Deep and quiet Deep and quiet    Iris Round and reactive Round and reactive    Lens 2+ NS 2+ NS    Vitreous Normal Normal                   ASSESSMENT/PLAN:     Abdoulaye Peguero is a 79 year old female who presents with:    #Corneal Abrasion of the left eye  #Blepharitis with MGD bilaterally  #Age-related nuclear sclerotic cataracts bilaterally  1.2 x 1.0 mm paracentral corneal epi defect within the visual axis of the left eye, with associated injection, pain, and photophobia.  Discomfort improved with proparacaine gtt.  No obvious ulceration but mild surrounding corneal haze suggestive of possible developing early ulcer.  No AC reaction present.  Vision 20/40 with correction bilaterally on near card.      PLAN:  - Polytrim 1 drop left eye QID   - Preservative free artificial tears up to every hour as needed  - Warm compresses for blepharitis/MGD  - Follow up exam in clinic in 2-3 days to monitor abrasion, ophthalmology will coordinate.  Patient desires dilated exam for floater in right eye    Seen and Discussed with NYDIA Gonzalez MD  PGY2, Ophthalmology

## 2019-07-21 NOTE — DISCHARGE INSTRUCTIONS
Please make an appointment to follow up with Eye Clinic (phone: [phone]) in 2-3 days.    The clinic will call you to schedule this appointment.  Your appointment will be at [address] [place] [address] in the 9th floor of the [facility].      Use Vigamoxx drops in the lest eye four times a day.  Use artifical tears in both eyes daily.  Use warm compresses for symptomatic relief.      If you have worsening pain, vision changes, facial swelling or other concerns, return to the emergency department for re-evaluation.

## 2019-07-21 NOTE — ED AVS SNAPSHOT
[facility], [place], Emergency Department  [address]  [facility] [address]  Phone:  [phone]  Fax:  [phone]                                    Abdoulaye Peguero   MRN: [id number]    Department:  [facility], Emergency Department   Date of Visit:  7/21/2019           After Visit Summary Signature Page    I have received my discharge instructions, and my questions have been answered. I have discussed any challenges I see with this plan with the nurse or doctor.    ..........................................................................................................................................  Patient/Patient Representative Signature      ..........................................................................................................................................  Patient Representative Print Name and Relationship to Patient    ..................................................               ................................................  Date                                   Time    ..........................................................................................................................................  Reviewed by Signature/Title    ...................................................              ..............................................  Date                                               Time          22EPIC Rev 08/18

## 2019-07-21 NOTE — ED PROVIDER NOTES
"    [facility] [place]     July 21, 2019    History     Chief Complaint   Patient presents with     Eye Pain     L eye pain started about one week ago. \"Feels like sand in eye\"     GEO Peguero is a 79 year old female with a history of GERD, hyperlipidemia, degenerative disc disease and prior history of episcleritis of the left eye who presents to the ED for evaluation of left eye pain. Patient reports the eye pain started a week ago, but it has become much worse over the past 3 days. She complains of a headache, photophobia, and blurry vision in the left eye. She states she has been unable to sleep for the past 2 or 3 nights because of the left eye pain. Patient denies eye discharge or eye injury. Patient denies using pain medication or eye drops. She notes she wears glasses for reading, but denies contact lenses.  Of note, patient reports she was seen about a year ago for right eye pain, but states this incident is not similar to her previous eye pain. She reports she takes medication for arthritis, denies any recent steroid use.     PAST MEDICAL HISTORY  History reviewed. No pertinent past medical history.  PAST SURGICAL HISTORY  History reviewed. No pertinent surgical history.  FAMILY HISTORY  History reviewed. No pertinent family history.  SOCIAL HISTORY  Social History     Tobacco Use     Smoking status: Never Smoker     Smokeless tobacco: Never Used   Substance Use Topics     Alcohol use: Never     Frequency: Never     Binge frequency: Never     MEDICATIONS  Current Facility-Administered Medications   Medication     fluorescein (FUL-TEOFILO) 1 MG ophthalmic strip     proparacaine (ALCAINE) 0.5 % ophthalmic solution     Current Outpatient Medications   Medication     acetaminophen (TYLENOL) 650 MG CR tablet     atorvastatin (LIPITOR) 20 MG tablet     omeprazole (PRILOSEC) 20 MG DR capsule     tiZANidine (ZANAFLEX) 4 MG tablet     tiZANidine (ZANAFLEX) 4 MG tablet     vitamin D3 " (CHOLECALCIFEROL) 2000 units (50 mcg) tablet     ALLERGIES  No Known Allergies      I have reviewed the Medications, Allergies, Past Medical and Surgical History, and Social History in the Epic system.    Review of Systems   HENT: Positive for ear pain.    Eyes: Positive for photophobia, pain (left eye) and visual disturbance (blurry vision). Negative for discharge.   Neurological: Positive for headaches.   All other systems reviewed and are negative.      Physical Exam   BP: 108/60  Pulse: 82  Temp: 98.1  F (36.7  C)  Resp: 18  SpO2: 97 %      Physical Exam  General: patient is alert and oriented and in no acute distress   Head: atraumatic and normocephalic   EENT: moist mucus membranes without tonsillar erythema or exudates, pupils 2mm equal round and reactive, significant photosensitivity bilaterally, injection of the left conjunctiva, abrasion on the left cornea at approximately 6 o'clock, EOMI  Neck: supple with full ROM  Cardiovascular: regular rate and rhythm, extremities warm and well perfused, no lower extremity edema  Pulmonary: lungs clear to auscultation bilaterally   Abdomen: soft, non-tender   Musculoskeletal: normal range of motion   Neurological: alert and oriented, moving all extremities symmetrically, gait normal   Skin: warm, dry       ED Course        Procedures       12:03 PM  The patient was seen and examined by Dr. Hein in Room 18.          Critical Care time:  none             Labs Ordered and Resulted from Time of ED Arrival Up to the Time of Departure from the ED - No data to display         Assessments & Plan (with Medical Decision Making)   Patient is an 79-year-old female with a history of GERD, hyperlipidemia, degenerative disc disease, and prior history of episcleritis who presents to the emergency department with left eye pain. Patient does have an elevated intraocular pressure in the left eye at 37.  Her visual acuity in the ED is 20/50 R and 20/100 L.  She does have a small  corneal abrasion at approximately 6:00, however, given her change in VA, the degree of light sensitivity and slight clouding of the cornea, I have asked ophthalmology to see the patient in the emergency department for concerns of scleritis versus episcleritis versus acute angle-closure glaucoma.  She denies any traumatic injury to suggest a traumatic iritis. Her extraocular movements are intact without any discomfort to suggest an orbital cellulitis and no signs of periorbital cellulitis on exam.    Patient seen by ophthalmology, felt to be due to corneal abrasions vs early ulcer.  Recommended treatment with polymixicin B-trimethoprim drops and artificial tears.  Patient will follow-up in ophthalmology clinic in 2-3 days for recheck.  Given close return instructions for the ED and voiced understanding.     This part of the medical record was transcribed by Elisabet Russo,  Medical Scribe, from a dictation done by Jane Hein MD.       I have reviewed the nursing notes.    I have reviewed the findings, diagnosis, plan and need for follow up with the patient.       Medication List      Started    dextran 70-hypromellose 0.1-0.3 % ophthalmic solution  Commonly known as:  TEARS NATURALE FREE PF  2 drops, Both Eyes, DAILY     moxifloxacin 0.5 % ophthalmic solution  Commonly known as:  VIGAMOX  1 drop, Left Eye, 4 TIMES DAILY            Final diagnoses:   Abrasion of left cornea, initial encounter     IElisabet, am serving as a trained medical scribe to document services personally performed by Jane Hein MD, based on the provider's statements to me.      I, Jane Hein MD, was physically present and have reviewed and verified the accuracy of this note documented by Elisabet Russo.     7/21/2019   [facility], EMERGENCY DEPARTMENT     Jane Hein MD  07/21/19 3082

## 2019-07-23 ENCOUNTER — TELEPHONE (OUTPATIENT)
Dept: OPHTHALMOLOGY | Facility: CLINIC | Age: 79
End: 2019-07-23

## 2019-07-23 NOTE — TELEPHONE ENCOUNTER
----- Message from Sai Alcaraz sent at 7/22/2019  4:21 PM CDT -----  Can you PLEASE do this for me? We are pretty far behind back here    Thank you  Sai  ----- Message -----  From: Chemo Gonzalez MD  Sent: 7/21/2019   3:25 PM  To: Sai Gibbs,    Would you mind helping coordinate a follow up appointment for this patient in general/continuity clinic on Tuesday/Wednesday?  She was seen on call for a corneal abrasion and will likely also need vision check, pressure check, and dilated exam.    Thanks in advance for your help!  Navid

## 2019-07-23 NOTE — TELEPHONE ENCOUNTER
Called with  on the phone, spoke with pt and scheduled for this Thursday w/Mammo.   Will send a letter for a reminder.    Mary Grace Forrest  Patient Representative

## 2019-07-25 ENCOUNTER — OFFICE VISIT (OUTPATIENT)
Dept: OPHTHALMOLOGY | Facility: CLINIC | Age: 79
End: 2019-07-25
Attending: OPHTHALMOLOGY
Payer: MEDICARE

## 2019-07-25 DIAGNOSIS — H25.13 NUCLEAR SCLEROTIC CATARACT OF BOTH EYES: ICD-10-CM

## 2019-07-25 DIAGNOSIS — H02.889 MEIBOMIAN GLAND DYSFUNCTION: ICD-10-CM

## 2019-07-25 DIAGNOSIS — H04.123 DRY EYES, BILATERAL: Primary | ICD-10-CM

## 2019-07-25 PROCEDURE — G0463 HOSPITAL OUTPT CLINIC VISIT: HCPCS | Mod: ZF

## 2019-07-25 ASSESSMENT — VISUAL ACUITY
OD_PH_CC+: -1
METHOD: SNELLEN - LINEAR
OD_CC: 20/40
OD_PH_CC: 20/30
OS_CC: 20/200
OD_CC+: -1
CORRECTION_TYPE: GLASSES

## 2019-07-25 ASSESSMENT — TONOMETRY
IOP_METHOD: ICARE
OS_IOP_MMHG: 17
OD_IOP_MMHG: 18

## 2019-07-25 ASSESSMENT — SLIT LAMP EXAM - LIDS
COMMENTS: MGD, BLEPHARITIS
COMMENTS: MGD, BLEPHARITIS

## 2019-07-25 ASSESSMENT — EXTERNAL EXAM - RIGHT EYE: OD_EXAM: NORMAL

## 2019-07-25 ASSESSMENT — EXTERNAL EXAM - LEFT EYE: OS_EXAM: NORMAL

## 2019-07-25 NOTE — PROGRESS NOTES
HPI  Abdoulaye Peguero is a 79 year old female who presented for ED f/u of left eye pain and redness (now 1.5 week history). [facility] had a similar episode in 2005. Although she doesn't remember the cause, she knows it resolved with drops. Over the past week, she has had increasing eye pain with blinking, foreign body sensation, photophobia, and tearing in her left eye.  Her right eye is unaffected, although she notes having a floater in the right eye which she has scheduled follow up for on Tuesday. She denies any trauma to the eyes, no history of ocular surgery.  She does not wear contact lenses.  No recent illnesses.  Negative history of glaucoma.     Was found to have corneal abrasion OS without cell/flare. Rx PolyTrim. Today states not worse but slightly better; pain/redness still persistent.     POH: Cataracts  GTTs: Polytrim QID OD  PMH: HLD  FH: No glaucoma or AMD  SH: Non-smoker    Assessment & Plan    #Corneal Abrasion of the left eye  #Blepharitis with MGD bilaterally  #Age-related nuclear sclerotic cataracts bilaterally  Epi defect resolved  Significant injection remains  KPs on cornea appear old    PLAN:  - Decrease PolyTrim BID, right eye   - START Preservative free artificial tears up to every hour as needed  - Warm compresses for blepharitis/MGD  - Visually significant cataracts, return after surface more optimized for accurate IOL calcs    - Return 1 week for BAT testing and IOL calcs and MRx    -----------------------------------------------------------------------------------      Salinas Fuentes M.D.  Ophthalmology, PGY-4    Attending Physician Attestation:  Complete documentation of historical and exam elements from today's encounter can be found in the full encounter summary report (not reduplicated in this progress note).  I personally obtained the chief complaint(s) and history of present illness.  I confirmed and edited as necessary the review of systems, past medical/surgical history, family  history, social history, and examination findings as documented by others; and I examined the patient myself.  I personally reviewed the relevant tests, images, and reports as documented above.  I formulated and edited as necessary the assessment and plan and discussed the findings and management plan with the patient and family. . - Elijah Ramirez MD

## 2019-07-25 NOTE — NURSING NOTE
"Chief Complaints and History of Present Illnesses   Patient presents with     Follow Up     4 day follow-up from ER visit 7/21/19     Chief Complaint(s) and History of Present Illness(es)     Follow Up     Comments: 4 day follow-up from ER visit 7/21/19              Comments     Pt complains of something \"moving in left eye\" and \"I don't see much out of my left eye\"  Complains of pressure and excessive tearing LE since Friday.  Denies any pain today.  Currently using Poly Trim QID LE - feels this medication is not helping.  Pt is unsure of why she had to come today for this appointment.    Jamia Byers OT 3:24 PM July 25, 2019                   "

## 2019-07-26 PROBLEM — R45.89 DEPRESSED MOOD: Status: ACTIVE | Noted: 2019-07-26

## 2019-07-31 ENCOUNTER — OFFICE VISIT (OUTPATIENT)
Dept: OPHTHALMOLOGY | Facility: CLINIC | Age: 79
End: 2019-07-31
Attending: PREVENTIVE MEDICINE
Payer: MEDICARE

## 2019-07-31 DIAGNOSIS — H25.13 NUCLEAR SCLEROTIC CATARACT OF BOTH EYES: Primary | ICD-10-CM

## 2019-07-31 DIAGNOSIS — H25.13 NUCLEAR SCLEROTIC CATARACT OF BOTH EYES: ICD-10-CM

## 2019-07-31 DIAGNOSIS — H04.123 DRY EYES, BILATERAL: ICD-10-CM

## 2019-07-31 PROCEDURE — 76519 ECHO EXAM OF EYE: CPT | Mod: ZF | Performed by: OPHTHALMOLOGY

## 2019-07-31 PROCEDURE — 92015 DETERMINE REFRACTIVE STATE: CPT | Mod: GY,ZF

## 2019-07-31 PROCEDURE — G0463 HOSPITAL OUTPT CLINIC VISIT: HCPCS | Mod: ZF

## 2019-07-31 ASSESSMENT — REFRACTION_MANIFEST
OD_AXIS: 100
OD_CYLINDER: +1.50
OS_AXIS: 075
OS_SPHERE: +0.25
OD_SPHERE: -0.25
OS_CYLINDER: +1.00

## 2019-07-31 ASSESSMENT — REFRACTION_WEARINGRX
OD_AXIS: 090
OD_ADD: +2.75
OD_SPHERE: -1.25
OS_CYLINDER: +1.00
OS_SPHERE: +0.25
OS_ADD: +2.75
OS_AXIS: 075
OD_CYLINDER: +2.25

## 2019-07-31 ASSESSMENT — EXTERNAL EXAM - LEFT EYE: OS_EXAM: NORMAL

## 2019-07-31 ASSESSMENT — VISUAL ACUITY
CORRECTION_TYPE: GLASSES
OD_CC: 20/50
OS_CC: 20/150
METHOD: NUMBERS - LINEAR
OD_CC+: +1
OS_PH_CC: 20/80

## 2019-07-31 ASSESSMENT — TONOMETRY
IOP_METHOD: TONOPEN
OS_IOP_MMHG: 12
OD_IOP_MMHG: 15

## 2019-07-31 ASSESSMENT — EXTERNAL EXAM - RIGHT EYE: OD_EXAM: NORMAL

## 2019-07-31 ASSESSMENT — SLIT LAMP EXAM - LIDS
COMMENTS: MGD, BLEPHARITIS
COMMENTS: MGD, BLEPHARITIS

## 2019-07-31 NOTE — NURSING NOTE
Chief Complaints and History of Present Illnesses   Patient presents with     Follow Up     Chief Complaint(s) and History of Present Illness(es)     Follow Up               Comments     Follow up for  IOL calcs, BAT and MRx.  The patient states her eye is doing better.  The patient is still using the artifical tears.  Cara Sheehan, TEE 3:07 PM 07/31/2019

## 2019-07-31 NOTE — PROGRESS NOTES
HPI  Abdoulaye Peguero is a 79 year old female who presented 7/25/19 for ED f/u of left eye pain and redness found secondary to corneal abrasion and significant dryness. She denies any trauma to the eyes, no history of ocular surgery.  She does not wear contact lenses.  No recent illnesses.  Negative history of glaucoma.     Interval: Stopped Polytrim, using ATs. Still has eye pain but doing better. Presents for IOL calcs.    POH: Cataracts  GTTs: ATs OU  PMH: HLD  FH: No glaucoma or AMD  SH: Non-smoker    Assessment & Plan    #Corneal Abrasion of the left eye  #Blepharitis with MGD bilaterally  Epi defect resolved  Injection improved  PEEs improved  KPs on cornea appear old    - Continue Preservative free artificial tears up to every hour as needed  - Warm compresses for blepharitis/MGD    #Age-related nuclear sclerotic cataracts bilaterally  - Visually significant cataracts, return after surface more optimized for accurate IOL calcs  - IOL calcs today  - Delvin next visit; patient offered today but would like to schedule another appointment if she wants cataract surgery    - Patient will call to schedule if she would like after discussion with family    - Return PRN if would like CE/IOL (OS first)    -----------------------------------------------------------------------------------      Salinas Fuentes M.D.  Ophthalmology, PGY-4    Teaching statement:  Complete documentation of historical and exam elements from today's encounter can be found in the full encounter summary report (not reduplicated in this progress note). I personally obtained the chief complaint(s) and history of present illness.  I confirmed and edited as necessary the review of systems, past medical/surgical history, family history, social history, and examination findings as documented by others; and I examined the patient myself. I personally reviewed the relevant tests, images, and reports as documented above.     I formulated and edited as necessary  the assessment and plan and discussed the findings and management plan with the patient and family.    Mariah Avery MD  Comprehensive Ophthalmology & Ocular Pathology  Department of Ophthalmology and Visual Neurosciences  [email or web address]  Pager [phone]

## 2019-08-28 ENCOUNTER — TELEPHONE (OUTPATIENT)
Dept: OBGYN | Facility: CLINIC | Age: 79
End: 2019-08-28

## 2019-08-28 NOTE — TELEPHONE ENCOUNTER
Called Holly but no answer, have not seen fax come through. Left message on number listed for Holly to call triage

## 2019-08-28 NOTE — TELEPHONE ENCOUNTER
----- Message from Maryann Blackman sent at 8/28/2019 10:50 AM CDT -----  Regarding: FW: Form to be completed - Dr Mcallister  Contact: [phone]      ----- Message -----  From: Kendy Bradshaw  Sent: 8/28/2019   9:23 AM  [facility]  Subject: Form to be completed - Dr Ary Barrera, the pt's  from [facility], states the pt needs a form completed for [facility] as the pt's benefits are in danger of being cut off. The form is regarding the pt's dietary restrictions. Holly states the pt is following a low cholesterol diet. The form is being faxed today - please complete and fax back asap. Please call Holly if there are any questions at [phone].    [place] - Kendy    Please DO NOT send this message and/or reply back to sender.  Call Center Representatives DO NOT respond to messages.

## 2019-09-25 ENCOUNTER — OFFICE VISIT (OUTPATIENT)
Dept: FAMILY MEDICINE | Facility: CLINIC | Age: 79
End: 2019-09-25
Attending: FAMILY MEDICINE
Payer: MEDICARE

## 2019-09-25 VITALS
BODY MASS INDEX: 26.55 KG/M2 | HEART RATE: 82 BPM | HEIGHT: 62 IN | SYSTOLIC BLOOD PRESSURE: 125 MMHG | DIASTOLIC BLOOD PRESSURE: 81 MMHG | WEIGHT: 144.3 LBS

## 2019-09-25 DIAGNOSIS — L21.9 SEBORRHEIC DERMATITIS OF SCALP: Primary | ICD-10-CM

## 2019-09-25 DIAGNOSIS — E78.00 HYPERCHOLESTEROLEMIA: ICD-10-CM

## 2019-09-25 PROCEDURE — G0463 HOSPITAL OUTPT CLINIC VISIT: HCPCS | Mod: ZF

## 2019-09-25 RX ORDER — ATORVASTATIN CALCIUM 20 MG/1
20 TABLET, FILM COATED ORAL DAILY
Qty: 30 TABLET | Refills: 5 | Status: SHIPPED | OUTPATIENT
Start: 2019-09-25 | End: 2020-08-26

## 2019-09-25 ASSESSMENT — MIFFLIN-ST. JEOR: SCORE: 1072.79

## 2019-09-25 ASSESSMENT — PATIENT HEALTH QUESTIONNAIRE - PHQ9: 5. POOR APPETITE OR OVEREATING: NOT AT ALL

## 2019-09-25 ASSESSMENT — ANXIETY QUESTIONNAIRES
7. FEELING AFRAID AS IF SOMETHING AWFUL MIGHT HAPPEN: NOT AT ALL
GAD7 TOTAL SCORE: 2
1. FEELING NERVOUS, ANXIOUS, OR ON EDGE: NOT AT ALL
2. NOT BEING ABLE TO STOP OR CONTROL WORRYING: NOT AT ALL
5. BEING SO RESTLESS THAT IT IS HARD TO SIT STILL: NOT AT ALL
3. WORRYING TOO MUCH ABOUT DIFFERENT THINGS: NOT AT ALL
6. BECOMING EASILY ANNOYED OR IRRITABLE: MORE THAN HALF THE DAYS

## 2019-09-25 NOTE — LETTER
"9/25/2019       RE: Abdoulaye Peguero  [address]  [place] [address]     Dear Colleague,    Thank you for referring your patient, Abdoulaye Peguero, to the [facility] at [facility]. Please see a copy of my visit note below.    HPI    Pt. Here for multiple concerns, present with     1. GERD--doing well on prilosec daily  2. Chronic neck and back pain--doing much better since last visit  3. Lipids-- reviewed with patient, need to renew medicatoin  4. Immunization review  Flu vaccine: due, pt states had immunization 2 months ago at another clinic  Prevnar: had  Pneumovax: due pt declines  Shingrix: due pt. Declines  Dislikes injections  5. Cancer screening:   Mammography: pt. Will consider  Colon cancer screening: no longer indicated  Pap: no longer indicated  6. Vitamin D deficienicy taking supplement, due for recheck,   7. Scalp itching     Review of Systems   Constitutional: Negative.    Respiratory: Negative.    Cardiovascular: Negative for chest pain and palpitations.   Skin:        No other rashes or itching     Physical Exam  Constitutional:       Appearance: Normal appearance.   Skin:     General: Skin is warm and dry.      Findings: No rash.      Comments: Scalp: dry, no rash, few scattered flakes   Neurological:      Mental Status: She is alert.   Psychiatric:         Mood and Affect: Mood normal.         Behavior: Behavior normal.     Blood pressure 125/81, pulse 82, height 1.575 m (5' 2\"), weight 65.5 kg (144 lb 4.8 oz), not currently breastfeeding.    A/P  1. Scalp dryness, possible seborrheic dermatitis  --trial of salacylic acid 2% shamposs1-2x/wk, can continue to moisturize with oil but rub into scalp and let sit for 5-10 minutes  2. Hypercholesterol--start Lipitor 20 mg daily and continue low choleserol diet  3. Breast cancer screening:counseled patent on guidelines for mammography, pt. Will consider   4. Counseling regarding " recommended immunizations: reviewed guidelines risks and benefits of these immunizations vs. Going without. Pt. Declines as above.  Will revisit at future visits  5. Vitamin D deficiency--repeat level    Total time spent face to face with the patient was 25 minutes. More than 50% of the time spent with the patient involved counseling and/or coordinating care.on the issues documented above. .    Follow-up 6 months, sooner if needed    Again, thank you for allowing me to participate in the care of your patient.      Sincerely,    Sai Mcallister MD

## 2019-09-25 NOTE — PROGRESS NOTES
"HPI    Pt. Here for multiple concerns, present with     1. GERD--doing well on prilosec daily  2. Chronic neck and back pain--doing much better since last visit  3. Lipids-- reviewed with patient, need to renew medicatoin  4. Immunization review  Flu vaccine: due, pt states had immunization 2 months ago at another clinic  Prevnar: had  Pneumovax: due pt declines  Shingrix: due pt. Declines  Dislikes injections    5. Cancer screening:   Mammography: pt. Will consider  Colon cancer screening: no longer indicated  Pap: no longer indicated    6. Vitamin D deficienicy taking supplement, due for recheck,   7. Scalp itching     Review of Systems   Constitutional: Negative.    Respiratory: Negative.    Cardiovascular: Negative for chest pain and palpitations.   Skin:        No other rashes or itching         Physical Exam  Constitutional:       Appearance: Normal appearance.   Skin:     General: Skin is warm and dry.      Findings: No rash.      Comments: Scalp: dry, no rash, few scattered flakes   Neurological:      Mental Status: She is alert.   Psychiatric:         Mood and Affect: Mood normal.         Behavior: Behavior normal.        Blood pressure 125/81, pulse 82, height 1.575 m (5' 2\"), weight 65.5 kg (144 lb 4.8 oz), not currently breastfeeding.    A/P  1. Scalp dryness, possible seborrheic dermatitis  --trial of salacylic acid 2% shamposs1-2x/wk, can continue to moisturize with oil but rub into scalp and let sit for 5-10 minutes  2. Hypercholesterol--start Lipitor 20 mg daily and continue low choleserol diet    3. Breast cancer screening:counseled patent on guidelines for mammography, pt. Will consider   4. Counseling regarding recommended immunizations: reviewed guidelines risks and benefits of these immunizations vs. Going without. Pt. Declines as above.  Will revisit at future visits  5. Vitamin D deficiency--repeat level    Total time spent face to face with the patient was 25 minutes. More than 50% " of the time spent with the patient involved counseling and/or coordinating care.on the issues documented above. .      Follow-up 6 months, sooner if needed

## 2019-09-26 ASSESSMENT — ANXIETY QUESTIONNAIRES: GAD7 TOTAL SCORE: 2

## 2019-09-30 ENCOUNTER — TELEPHONE (OUTPATIENT)
Dept: OBGYN | Facility: CLINIC | Age: 79
End: 2019-09-30

## 2019-09-30 NOTE — TELEPHONE ENCOUNTER
Abdoulaye Barrera's , calling to report state states they never received dietary restriction form filled out by Dr Mcallister.  Holly will let Abdoulaye know we at [facility] need another copy of this form(not sure if Dr Mcallister kept original copy) And I let Holly know I will send Dr Mcallister a message to find out if she did fax it and if she kept a copy.

## 2019-10-09 ASSESSMENT — ENCOUNTER SYMPTOMS
CONSTITUTIONAL NEGATIVE: 1
PALPITATIONS: 0
RESPIRATORY NEGATIVE: 1

## 2020-03-11 ENCOUNTER — HEALTH MAINTENANCE LETTER (OUTPATIENT)
Age: 80
End: 2020-03-11

## 2020-07-15 ENCOUNTER — MEDICAL CORRESPONDENCE (OUTPATIENT)
Dept: HEALTH INFORMATION MANAGEMENT | Facility: CLINIC | Age: 80
End: 2020-07-15

## 2020-08-26 ENCOUNTER — OFFICE VISIT (OUTPATIENT)
Dept: FAMILY MEDICINE | Facility: CLINIC | Age: 80
End: 2020-08-26
Attending: FAMILY MEDICINE
Payer: MEDICARE

## 2020-08-26 VITALS
HEART RATE: 79 BPM | WEIGHT: 132 LBS | SYSTOLIC BLOOD PRESSURE: 131 MMHG | DIASTOLIC BLOOD PRESSURE: 85 MMHG | BODY MASS INDEX: 24.14 KG/M2

## 2020-08-26 DIAGNOSIS — M47.9 DEGENERATIVE JOINT DISEASE OF LOW BACK: ICD-10-CM

## 2020-08-26 DIAGNOSIS — L21.9 SEBORRHEIC DERMATITIS OF SCALP: ICD-10-CM

## 2020-08-26 DIAGNOSIS — E78.00 HYPERCHOLESTEROLEMIA: ICD-10-CM

## 2020-08-26 DIAGNOSIS — M51.369 DDD (DEGENERATIVE DISC DISEASE), LUMBAR: ICD-10-CM

## 2020-08-26 DIAGNOSIS — M50.30 DDD (DEGENERATIVE DISC DISEASE), CERVICAL: ICD-10-CM

## 2020-08-26 DIAGNOSIS — R73.03 PREDIABETES: Primary | ICD-10-CM

## 2020-08-26 DIAGNOSIS — N30.00 ACUTE CYSTITIS WITHOUT HEMATURIA: ICD-10-CM

## 2020-08-26 LAB
ALBUMIN UR-MCNC: ABNORMAL MG/DL
APPEARANCE UR: CLEAR
BILIRUB UR QL STRIP: NEGATIVE
CHOLEST SERPL-MCNC: 206 MG/DL
COLOR UR AUTO: YELLOW
GLUCOSE UR STRIP-MCNC: NEGATIVE MG/DL
HBA1C MFR BLD: 5.5 % (ref 0–5.6)
HDLC SERPL-MCNC: 51 MG/DL
HGB UR QL STRIP: ABNORMAL
KETONES UR STRIP-MCNC: NEGATIVE MG/DL
LDLC SERPL CALC-MCNC: 133 MG/DL
LEUKOCYTE ESTERASE UR QL STRIP: ABNORMAL
NITRATE UR QL: NEGATIVE
NONHDLC SERPL-MCNC: 155 MG/DL
PH UR STRIP: 7 PH (ref 5–7)
SP GR UR STRIP: 1.01 (ref 1–1.03)
TRIGL SERPL-MCNC: 108 MG/DL
UROBILINOGEN UR STRIP-ACNC: 0.2 EU/DL (ref 0.2–1)

## 2020-08-26 PROCEDURE — G0463 HOSPITAL OUTPT CLINIC VISIT: HCPCS | Mod: ZF

## 2020-08-26 PROCEDURE — 87086 URINE CULTURE/COLONY COUNT: CPT | Performed by: FAMILY MEDICINE

## 2020-08-26 PROCEDURE — 83036 HEMOGLOBIN GLYCOSYLATED A1C: CPT | Performed by: FAMILY MEDICINE

## 2020-08-26 PROCEDURE — 80061 LIPID PANEL: CPT | Performed by: FAMILY MEDICINE

## 2020-08-26 PROCEDURE — 36415 COLL VENOUS BLD VENIPUNCTURE: CPT | Performed by: FAMILY MEDICINE

## 2020-08-26 PROCEDURE — 81003 URINALYSIS AUTO W/O SCOPE: CPT

## 2020-08-26 RX ORDER — SENNOSIDES 8.6 MG
650 CAPSULE ORAL EVERY 8 HOURS PRN
Qty: 90 TABLET | Refills: 11 | Status: SHIPPED | OUTPATIENT
Start: 2020-08-26 | End: 2023-08-23

## 2020-08-26 RX ORDER — ATORVASTATIN CALCIUM 20 MG/1
20 TABLET, FILM COATED ORAL DAILY
Qty: 30 TABLET | Refills: 5 | Status: SHIPPED | OUTPATIENT
Start: 2020-08-26 | End: 2022-02-04

## 2020-08-26 RX ORDER — SULFAMETHOXAZOLE/TRIMETHOPRIM 800-160 MG
1 TABLET ORAL 2 TIMES DAILY
Qty: 6 TABLET | Refills: 0 | Status: SHIPPED | OUTPATIENT
Start: 2020-08-26 | End: 2020-08-29

## 2020-08-26 ASSESSMENT — PAIN SCALES - GENERAL: PAINLEVEL: NO PAIN (0)

## 2020-08-26 NOTE — PROGRESS NOTES
Subjective     Abdoulaye Peguero is a 80 year old female who presents to clinic today for the following health issues:    HPI   Pt. Here for multiple issue  Her PCA is her to interpret--last seen 9/2019    1. Urinary urgency, frequency and pressure for 3-4 weeks  No fever, no blood in urine  She notes increased frequency after she eats  No polydipsia  No back pain    2. Scalp itching, diagnosed with seborrheic dermatitis last visit  Was unable to get shampoo from last visit, would like another prescription    3. Hypercholesterol--started Lpitor 20 mg last visit, no problems with medication, states is taking regularly  She is due for repeat lipid test, glucose and HgbA1c  Had breakfast only today        4.OA of spine,  Degenerative disc disease in back and neck  She  had PT, was on tizanadine. Did better for a time, but still having pain, trouble lifting arms. Ran out of tizanadine.     Patient Active Problem List   Diagnosis     Chronic headache     Gastroesophageal reflux disease     Hyperlipidemia     Prediabetes     Urinary incontinence     Vitamin D deficiency     Body aches     DDD (degenerative disc disease), lumbar     Osteoarthritis of spine with radiculopathy, thoracolumbar region     Depressed mood     Current Outpatient Medications   Medication     acetaminophen (TYLENOL) 650 MG CR tablet     atorvastatin (LIPITOR) 20 MG tablet     dextran 70-hypromellose (TEARS NATURALE FREE PF) 0.1-0.3 % ophthalmic solution     glycerin-hypromellose- (VISINE) 0.2-0.2-1 % SOLN ophthalmic solution     moxifloxacin (VIGAMOX) 0.5 % ophthalmic solution     omeprazole (PRILOSEC) 20 MG DR capsule     POLYMYXIN B-TRIMETHOPRIM OP     salicylic acid (P&S) 2 % external shampoo     tiZANidine (ZANAFLEX) 4 MG tablet     vitamin D3 (CHOLECALCIFEROL) 2000 units (50 mcg) tablet     No current facility-administered medications for this visit.      Social History     Socioeconomic History     Marital status: Single     Spouse name: Not  on file     Number of children: Not on file     Years of education: Not on file     Highest education level: Not on file   Occupational History     Not on file   Social Needs     Financial resource strain: Not on file     Food insecurity     Worry: Not on file     Inability: Not on file     Transportation needs     Medical: Not on file     Non-medical: Not on file   Tobacco Use     Smoking status: Never Smoker     Smokeless tobacco: Never Used   Substance and Sexual Activity     Alcohol use: Never     Frequency: Never     Binge frequency: Never     Drug use: Never     Sexual activity: Not Currently     Partners: Male     Birth control/protection: Post-menopausal   Lifestyle     Physical activity     Days per week: Not on file     Minutes per session: Not on file     Stress: Not on file   Relationships     Social connections     Talks on phone: Not on file     Gets together: Not on file     Attends Buddhist service: Not on file     Active member of club or organization: Not on file     Attends meetings of clubs or organizations: Not on file     Relationship status: Not on file     Intimate partner violence     Fear of current or ex partner: Not on file     Emotionally abused: Not on file     Physically abused: Not on file     Forced sexual activity: Not on file   Other Topics Concern     Not on file   Social History Narrative     Not on file         Review of Systems   Constitutional: Negative for chills and fever.   Endocrine: Negative for polydipsia.   Genitourinary: Positive for frequency and urgency. Negative for flank pain and hematuria.    As noted above        Objective    /85   Pulse 79   Wt 59.9 kg (132 lb)   Breastfeeding No   BMI 24.14 kg/m    Body mass index is 24.14 kg/m .  Physical Exam   GENERAL: healthy, alert and no distress  RESP: lungs clear to auscultation - no rales, rhonchi or wheezes  CV: regular rate and rhythm, normal S1 S2, no S3 or S4, no murmur, click or rub, no peripheral edema  and peripheral pulses strong  ABDOMEN: soft, nontender, without hepatosplenomegaly or masses, bowel sounds normal and mild epigastric tender to palpation  No CVA or suprapubic tenderness  Grasp strong  No peripheral edema    A/P  1. UTI  Send culture  Bactrim DS 1 tab bid x 3 days  Call if not improving    2. Seborrheic dermatitis  Reorder salacylic acid shampoo from prior visit, if not available, consider selenium sulfate shampoo  3. Hypercholestrol  Continue Lipitor  Lipids, HgbA1c  4. OA  Discussed pain management: continue home exercises, refill tiazaninde 4 mg 1-2 tabs tid prn  acetominophen ER 1 tab tid prn    Follow-up 4 months    Sai Mcallister MD

## 2020-08-26 NOTE — LETTER
8/26/2020       RE: Abdoulaye Peguero  [address], [address]  [place] [address]     Dear Colleague,    Thank you for referring your patient, Abdoulaye Peguero, to the [facility] at [facility]. Please see a copy of my visit note below.    Subjective     Abdoulaye Peguero is a 80 year old female who presents to clinic today for the following health issues:    HPI   Pt. Here for multiple issue  Her PCA is her to interpret--last seen 9/2019    1. Urinary urgency, frequency and pressure for 3-4 weeks  No fever, no blood in urine  She notes increased frequency after she eats  No polydipsia  No back pain    2. Scalp itching, diagnosed with seborrheic dermatitis last visit  Was unable to get shampoo from last visit, would like another prescription    3. Hypercholesterol--started Lpitor 20 mg last visit, no problems with medication, states is taking regularly  She is due for repeat lipid test, glucose and HgbA1c  Had breakfast only today        4.OA of spine,  Degenerative disc disease in back and neck  She  had PT, was on tizanadine. Did better for a time, but still having pain, trouble lifting arms. Ran out of tizanadine.     Patient Active Problem List   Diagnosis     Chronic headache     Gastroesophageal reflux disease     Hyperlipidemia     Prediabetes     Urinary incontinence     Vitamin D deficiency     Body aches     DDD (degenerative disc disease), lumbar     Osteoarthritis of spine with radiculopathy, thoracolumbar region     Depressed mood     Current Outpatient Medications   Medication     acetaminophen (TYLENOL) 650 MG CR tablet     atorvastatin (LIPITOR) 20 MG tablet     dextran 70-hypromellose (TEARS NATURALE FREE PF) 0.1-0.3 % ophthalmic solution     glycerin-hypromellose- (VISINE) 0.2-0.2-1 % SOLN ophthalmic solution     moxifloxacin (VIGAMOX) 0.5 % ophthalmic solution     omeprazole (PRILOSEC) 20 MG DR capsule     POLYMYXIN B-TRIMETHOPRIM OP      salicylic acid (P&S) 2 % external shampoo     tiZANidine (ZANAFLEX) 4 MG tablet     vitamin D3 (CHOLECALCIFEROL) 2000 units (50 mcg) tablet     No current facility-administered medications for this visit.      Social History     Socioeconomic History     Marital status: Single     Spouse name: Not on file     Number of children: Not on file     Years of education: Not on file     Highest education level: Not on file   Occupational History     Not on file   Social Needs     Financial resource strain: Not on file     Food insecurity     Worry: Not on file     Inability: Not on file     Transportation needs     Medical: Not on file     Non-medical: Not on file   Tobacco Use     Smoking status: Never Smoker     Smokeless tobacco: Never Used   Substance and Sexual Activity     Alcohol use: Never     Frequency: Never     Binge frequency: Never     Drug use: Never     Sexual activity: Not Currently     Partners: Male     Birth control/protection: Post-menopausal   Lifestyle     Physical activity     Days per week: Not on file     Minutes per session: Not on file     Stress: Not on file   Relationships     Social connections     Talks on phone: Not on file     Gets together: Not on file     Attends Restoration service: Not on file     Active member of club or organization: Not on file     Attends meetings of clubs or organizations: Not on file     Relationship status: Not on file     Intimate partner violence     Fear of current or ex partner: Not on file     Emotionally abused: Not on file     Physically abused: Not on file     Forced sexual activity: Not on file   Other Topics Concern     Not on file   Social History Narrative     Not on file         Review of Systems   Constitutional: Negative for chills and fever.   Endocrine: Negative for polydipsia.   Genitourinary: Positive for frequency and urgency. Negative for flank pain and hematuria.    As noted above        Objective    /85   Pulse 79   Wt 59.9 kg (132  lb)   Breastfeeding No   BMI 24.14 kg/m    Body mass index is 24.14 kg/m .  Physical Exam   GENERAL: healthy, alert and no distress  RESP: lungs clear to auscultation - no rales, rhonchi or wheezes  CV: regular rate and rhythm, normal S1 S2, no S3 or S4, no murmur, click or rub, no peripheral edema and peripheral pulses strong  ABDOMEN: soft, nontender, without hepatosplenomegaly or masses, bowel sounds normal and mild epigastric tender to palpation  No CVA or suprapubic tenderness  Grasp strong  No peripheral edema    A/P  1. UTI  Send culture  Bactrim DS 1 tab bid x 3 days  Call if not improving    2. Seborrheic dermatitis  Reorder salacylic acid shampoo from prior visit, if not available, consider selenium sulfate shampoo  3. Hypercholestrol  Continue Lipitor  Lipids, HgbA1c  4. OA  Discussed pain management: continue home exercises, refill tiazaninde 4 mg 1-2 tabs tid prn  acetominophen ER 1 tab tid prn    Follow-up 4 months    Sai Mcallister MD

## 2020-08-27 LAB
BACTERIA SPEC CULT: NORMAL
SPECIMEN SOURCE: NORMAL

## 2020-09-09 ASSESSMENT — ENCOUNTER SYMPTOMS
POLYDIPSIA: 0
FREQUENCY: 1
FLANK PAIN: 0
CHILLS: 0
FEVER: 0
HEMATURIA: 0

## 2020-09-14 NOTE — RESULT ENCOUNTER NOTE
Dear Abdoulaye,  Here are your recent results. Your diabetes screen was normal. Your cholesterol labs were fairly good. Make sure that you are taking your medication every day.    Please let us know if you have any questions or concerns.    Regards,  Sai Mcallister MD

## 2021-01-03 ENCOUNTER — HEALTH MAINTENANCE LETTER (OUTPATIENT)
Age: 81
End: 2021-01-03

## 2021-03-04 ENCOUNTER — OFFICE VISIT (OUTPATIENT)
Dept: URGENT CARE | Facility: URGENT CARE | Age: 81
End: 2021-03-04
Payer: MEDICARE

## 2021-03-04 VITALS
HEART RATE: 88 BPM | RESPIRATION RATE: 12 BRPM | TEMPERATURE: 97.6 F | WEIGHT: 125 LBS | BODY MASS INDEX: 22.86 KG/M2 | DIASTOLIC BLOOD PRESSURE: 74 MMHG | SYSTOLIC BLOOD PRESSURE: 142 MMHG | OXYGEN SATURATION: 98 %

## 2021-03-04 DIAGNOSIS — M79.604 PAIN AND SWELLING OF RIGHT LOWER EXTREMITY: Primary | ICD-10-CM

## 2021-03-04 DIAGNOSIS — M79.89 PAIN AND SWELLING OF RIGHT LOWER EXTREMITY: Primary | ICD-10-CM

## 2021-03-04 PROCEDURE — 99215 OFFICE O/P EST HI 40 MIN: CPT | Performed by: STUDENT IN AN ORGANIZED HEALTH CARE EDUCATION/TRAINING PROGRAM

## 2021-03-04 NOTE — NURSING NOTE
"Chief Complaint   Patient presents with     Urgent Care     Edema     Her right foot and knee is swollen today.  She has been having some pain recently but the swelling is new.  No recent injury that she knows of.  She rates the pain 7/10.     Initial BP (!) 142/74 (BP Location: Right arm, Patient Position: Sitting, Cuff Size: Adult Regular)   Pulse 88   Temp 97.6  F (36.4  C) (Tympanic)   Resp 12   SpO2 98%  Estimated body mass index is 24.14 kg/m  as calculated from the following:    Height as of 9/25/19: 1.575 m (5' 2\").    Weight as of 8/26/20: 59.9 kg (132 lb)..  BP completed using cuff size: regular  Nicole Charlton R.N.    "

## 2021-03-05 ENCOUNTER — APPOINTMENT (OUTPATIENT)
Dept: GENERAL RADIOLOGY | Facility: CLINIC | Age: 81
End: 2021-03-05
Attending: EMERGENCY MEDICINE
Payer: MEDICARE

## 2021-03-05 ENCOUNTER — APPOINTMENT (OUTPATIENT)
Dept: ULTRASOUND IMAGING | Facility: CLINIC | Age: 81
End: 2021-03-05
Attending: EMERGENCY MEDICINE
Payer: MEDICARE

## 2021-03-05 ENCOUNTER — HOSPITAL ENCOUNTER (EMERGENCY)
Facility: CLINIC | Age: 81
Discharge: HOME | End: 2021-03-05
Attending: EMERGENCY MEDICINE | Admitting: EMERGENCY MEDICINE
Payer: MEDICARE

## 2021-03-05 VITALS
SYSTOLIC BLOOD PRESSURE: 154 MMHG | TEMPERATURE: 97 F | DIASTOLIC BLOOD PRESSURE: 105 MMHG | OXYGEN SATURATION: 100 % | HEART RATE: 86 BPM | RESPIRATION RATE: 16 BRPM

## 2021-03-05 DIAGNOSIS — S82.191A OTHER CLOSED FRACTURE OF PROXIMAL END OF RIGHT TIBIA, INITIAL ENCOUNTER: ICD-10-CM

## 2021-03-05 DIAGNOSIS — S80.11XA TRAUMATIC ECCHYMOSIS OF RIGHT LOWER LEG, INITIAL ENCOUNTER: ICD-10-CM

## 2021-03-05 LAB
ALBUMIN SERPL-MCNC: 3.3 G/DL (ref 3.4–5)
ALP SERPL-CCNC: 66 U/L (ref 40–150)
ALT SERPL W P-5'-P-CCNC: 16 U/L (ref 0–50)
ANION GAP SERPL CALCULATED.3IONS-SCNC: 2 MMOL/L (ref 3–14)
APTT PPP: 25 SEC (ref 22–37)
AST SERPL W P-5'-P-CCNC: 14 U/L (ref 0–45)
BASOPHILS # BLD AUTO: 0 10E9/L (ref 0–0.2)
BASOPHILS NFR BLD AUTO: 0.5 %
BILIRUB SERPL-MCNC: 0.2 MG/DL (ref 0.2–1.3)
BUN SERPL-MCNC: 15 MG/DL (ref 7–30)
CALCIUM SERPL-MCNC: 9 MG/DL (ref 8.5–10.1)
CHLORIDE SERPL-SCNC: 110 MMOL/L (ref 94–109)
CO2 SERPL-SCNC: 28 MMOL/L (ref 20–32)
CREAT SERPL-MCNC: 0.58 MG/DL (ref 0.52–1.04)
DIFFERENTIAL METHOD BLD: ABNORMAL
EOSINOPHIL # BLD AUTO: 0.1 10E9/L (ref 0–0.7)
EOSINOPHIL NFR BLD AUTO: 2.1 %
ERYTHROCYTE [DISTWIDTH] IN BLOOD BY AUTOMATED COUNT: 11.9 % (ref 10–15)
GFR SERPL CREATININE-BSD FRML MDRD: 85 ML/MIN/{1.73_M2}
GLUCOSE SERPL-MCNC: 94 MG/DL (ref 70–99)
HCT VFR BLD AUTO: 34.8 % (ref 35–47)
HGB BLD-MCNC: 10.8 G/DL (ref 11.7–15.7)
IMM GRANULOCYTES # BLD: 0 10E9/L (ref 0–0.4)
IMM GRANULOCYTES NFR BLD: 0.3 %
INR PPP: 1.05 (ref 0.86–1.14)
LYMPHOCYTES # BLD AUTO: 1.7 10E9/L (ref 0.8–5.3)
LYMPHOCYTES NFR BLD AUTO: 29.7 %
MCH RBC QN AUTO: 31.9 PG (ref 26.5–33)
MCHC RBC AUTO-ENTMCNC: 31 G/DL (ref 31.5–36.5)
MCV RBC AUTO: 103 FL (ref 78–100)
MONOCYTES # BLD AUTO: 0.5 10E9/L (ref 0–1.3)
MONOCYTES NFR BLD AUTO: 7.7 %
NEUTROPHILS # BLD AUTO: 3.5 10E9/L (ref 1.6–8.3)
NEUTROPHILS NFR BLD AUTO: 59.7 %
NRBC # BLD AUTO: 0 10*3/UL
NRBC BLD AUTO-RTO: 0 /100
PLATELET # BLD AUTO: 233 10E9/L (ref 150–450)
POTASSIUM SERPL-SCNC: 4.1 MMOL/L (ref 3.4–5.3)
PROT SERPL-MCNC: 7.1 G/DL (ref 6.8–8.8)
RBC # BLD AUTO: 3.39 10E12/L (ref 3.8–5.2)
SODIUM SERPL-SCNC: 140 MMOL/L (ref 133–144)
WBC # BLD AUTO: 5.8 10E9/L (ref 4–11)

## 2021-03-05 PROCEDURE — 73610 X-RAY EXAM OF ANKLE: CPT | Mod: RT

## 2021-03-05 PROCEDURE — 73562 X-RAY EXAM OF KNEE 3: CPT | Mod: RT

## 2021-03-05 PROCEDURE — 85730 THROMBOPLASTIN TIME PARTIAL: CPT | Performed by: EMERGENCY MEDICINE

## 2021-03-05 PROCEDURE — 85610 PROTHROMBIN TIME: CPT | Performed by: EMERGENCY MEDICINE

## 2021-03-05 PROCEDURE — 73590 X-RAY EXAM OF LOWER LEG: CPT | Mod: RT

## 2021-03-05 PROCEDURE — 93971 EXTREMITY STUDY: CPT | Mod: RT

## 2021-03-05 PROCEDURE — 85025 COMPLETE CBC W/AUTO DIFF WBC: CPT | Performed by: EMERGENCY MEDICINE

## 2021-03-05 PROCEDURE — 36415 COLL VENOUS BLD VENIPUNCTURE: CPT | Performed by: EMERGENCY MEDICINE

## 2021-03-05 PROCEDURE — 80053 COMPREHEN METABOLIC PANEL: CPT | Performed by: EMERGENCY MEDICINE

## 2021-03-05 PROCEDURE — 27530 TREAT KNEE FRACTURE: CPT | Mod: RT

## 2021-03-05 PROCEDURE — 93005 ELECTROCARDIOGRAM TRACING: CPT

## 2021-03-05 PROCEDURE — 99285 EMERGENCY DEPT VISIT HI MDM: CPT | Mod: 25

## 2021-03-05 RX ORDER — ACETAMINOPHEN 325 MG/1
650 TABLET ORAL ONCE
Status: DISCONTINUED | OUTPATIENT
Start: 2021-03-05 | End: 2021-03-05 | Stop reason: HOSPADM

## 2021-03-05 ASSESSMENT — ENCOUNTER SYMPTOMS
FEVER: 0
ARTHRALGIAS: 1
SHORTNESS OF BREATH: 0
COUGH: 0
CHILLS: 0
JOINT SWELLING: 1

## 2021-03-05 NOTE — DISCHARGE INSTRUCTIONS
I recommend elevating her right leg, icing her knee and 650 mg of Tylenol every 6 hours for pain.  She should wear knee immobilizer when up and moving around and limit the weight put on that leg using a walker or crutches.  She can take the immobilizer off for showering.  I recommend follow-up with orthopedics for recheck.  You should return to the emergency room should you have severe pain, develop fever or be unable to walk.

## 2021-03-05 NOTE — PATIENT INSTRUCTIONS
Patient Education     Understanding Deep Vein Thrombosis  Deep vein thrombosis (DVT) is a condition in which a blood clot or thrombus forms in a deep vein. A blood clot is most common in the leg, but can develop in a large vein deep inside the leg, arm, or other part of the body. Part of the clot called an embolus can separate from the vein. It may travel to the lungs and form a pulmonary embolus (PE). This can cut off blood flow to part of the lung or to the entire lung. A PE is a medical emergency and may cause death. Healthcare providers use the term venous thromboembolism (VTE) to describe the two conditions, DVT and PE. They use the term VTE because the two conditions are very closely related. And, because their prevention and treatment are closely related.   Over time, a blood clot can also permanently damage veins. To protect your health, a blood clot must be treated right away.   How DVT develops  The deep veins of the legs are located in the muscles. These help carry blood from the legs to the heart. When leg muscles contract and relax, blood is squeezed through the veins back to the heart. One-way valves inside the veins help keep the blood moving in the right direction. When blood moves too slowly or not at all, it can pool in the veins. This makes a clot more likely to form.      When a muscle contracts, the valve opens. Blood is squeezed toward the heart. When blood moves slowly in a vein, a clot (thrombus) can form. A part of the clot can break off and travel in the bloodstream (embolus).   Risk factors  Anyone can develop a blood clot. The following risk factors make a blood clot more likely to happen:     Being inactive for a long period, such as when you re in the hospital, or traveling by plane or car    Injury to a vein from an accident, a broken bone, or surgery    Having blood clots in the past    Personal or family history of a blood-clotting disorder    Recent surgery    Cancer and certain  cancer treatments    Smoking  Other factors can also put you at higher risk for a blood clot. They include:    Age over 60 years    Pregnancy    Taking birth control or hormone replacement    Having other vein problems    Being overweight  Common symptoms  A blood clot does not always cause obvious symptoms. If you do have symptoms, they usually happen suddenly. They may include:     Pain, especially deep in the muscle    Swelling    Aching or tenderness    Red or warm skin    Fever  Call your healthcare provider if you have these symptoms.  Symptoms of pulmonary embolism may include:    Trouble breathing    Fast heartbeat    Chest pain    Sweating    Coughing (may cough up blood)    Fainting  Call 911 if you have these symptoms.    If you take medicine to help prevent blood clots, you have an increased risk of bleeding. Call 911 if you have heavy or uncontrolled bleeding. Call your healthcare provider if you have other signs or symptoms of bleeding like blood in the urine, bleeding with bowel movements, or bleeding from the nose, gums, a cut, or vagina.   Diagnosing DVT  Your healthcare provider will start with questions about your symptoms and health history along with a physical exam.   Diagnostic tests include:    An imaging test called a duplex ultrasound. This test uses sound waves to create pictures of veins and blood flow.    Blood tests to check for clotting and other problems.  If your healthcare provider thinks you may have pulmonary embolism, additional testing will be done.   Treating DVT  Treating a blood clot may include:    Medicine to thin the blood and prevent pulmonary embolism and other complications.    Staying in the hospital. This may or may not be necessary.    Surgery for some people, like those who can't take blood-thinning medicines.  Preventing DVT  Many people who are in the hospital are at an increased risk of developing blood clots. So, preventing blood clots is an important part of  in-hospital care. The care may include getting out of bed regularly, taking medicine, or using special therapies or devices. Other factors and conditions may increase the risk of blood clots. Review your risk with your healthcare provider.   [employer] last reviewed this educational content on 12/1/2019 2000-2020 [employer]. All rights reserved. This information is not intended as a substitute for professional medical care. Always follow your healthcare professional's instructions.           Patient Education     Complications of Deep Vein Thrombosis   Deep vein thrombosis (DVT) is a condition involving the formation of a blood clot or thrombus in a deep vein. One may develop in a large vein deep inside the leg, arm, or other part of the body. Complications from deep vein thrombosis can be very serious. They can include pulmonary embolism (PE), chronic venous insufficiency, and post-thrombotic syndrome.    You may hear healthcare providers use the term venous thromboembolism (VTE) to describe DVT and PE. They use the term VTE because the two conditions are very closely related. And, because their prevention and treatment are closely related.   Pulmonary embolism   Pulmonary embolism (PE) happens when part of the clot, called an embolus, separates from the vein. It travels to the lungs and cuts off the flow of blood. A PE may develop quickly. It's a medical emergency and may cause death.          Call 911  Call 911 if you have symptoms of a blood clot in the lungs. Symptoms may include:     Chest pain    Trouble breathing or sudden shortness of breath    Coughing (may cough up blood)    Fainting    Fast heartbeat    Sweating  You may have bleeding if you take medicine to help prevent blood clots. Call 911 if you have heavy or uncontrolled bleeding.   When to call your healthcare provider  Call your healthcare provider if you have symptoms of a blood clot. The symptoms include:      Swelling    Pain    Redness in your leg, arm, or other area  Call your healthcare provider if you have signs or symptoms of bleeding, like blood in the urine, bleeding with bowel movements, or bleeding from the nose, gums, a cut, or vagina.   Chronic venous insufficiency and post-thrombotic syndrome   Two other complications of deep vein thrombosis are chronic venous insufficiency and post-thrombotic syndrome.   Chronic venous insufficiency  may happen following deep vein thrombosis of a leg vein. It means that a vein no longer works as well. It's a long-term condition where blood stays in the vein instead of flowing back to the heart. Pain and swelling in the leg are common symptoms.   Post-thrombotic syndrome may also happen following deep vein thrombosis of a leg vein. It's a long-term problem with pain, swelling, and redness. Ulcers and sores can also happen if the condition is not treated early. These complications and associated symptoms may make it hard to walk and take part in daily activities.   [employer] last reviewed this educational content on 10/1/2019    0394-2054 [employer]. All rights reserved. This information is not intended as a substitute for professional medical care. Always follow your healthcare professional's instructions.

## 2021-03-05 NOTE — ED PROVIDER NOTES
History   Chief Complaint:  Leg Swelling     The history is provided by the patient.      Abdoulaye Peguero is a 80 year old female with history of hyperlipidemia and frequent falls, who presents with leg swelling and pain. The patient reports that she was seen at  a few days ago but was told to come to the ED for imaging. The patient's daughter helped to translate and notes that today her pain had worsened but that she has ahd troubles walking as her right knee and ankle are painful and that she has had swelling of the right leg. She denies recent trauma or fall, but has some bruising to the left ankle and knee. She denies any chest pain, shortness of breath, chills, fever, dysuria, or other concerns. She denies blood thinner use.    Review of Systems   Constitutional: Negative for chills and fever.   Respiratory: Negative for cough and shortness of breath.    Cardiovascular: Negative for chest pain.   Musculoskeletal: Positive for arthralgias, gait problem and joint swelling.   All other systems reviewed and are negative.      Allergies:  No known drug allergies     Medications:  Atorvastatin   Omeprazole  Zanaflex    Past Medical History:    Depression   DDD  Osteoarthritis  Hyperlipidemia  Urinary incontinence  Prediabetes  GERD  Chronic headache    Frequent falls    Past Surgical History:    The patient denies past surgical history.     Family History:    The patient denies past family history.     Social History:  Smoking status: never smoker  Alcohol use: no  Drug use: no  PCP: Sai Mcallister  Presents to the ED with daughter    Physical Exam     Patient Vitals for the past 24 hrs:   BP Temp Temp src Pulse Resp SpO2   03/05/21 1210 (!) 145/82 -- -- 75 -- 100 %   03/05/21 1209 (!) 145/82 -- -- 71 18 100 %   03/05/21 1019 119/87 97  F (36.1  C) Temporal 76 18 99 %       Physical Exam  Constitutional: Alert, attentive, GCS 15  HENT:    Nose: Nose normal.    Mouth/Throat: Oropharynx is clear, mucous membranes are  moist  Eyes: EOM are normal, anicteric, conjugate gaze  CV: Distal right foot warm, well-perfused.  Chest: Nonlabored breathing on room air  GI:  non tender. No distension. No guarding or rebound.    MSK: 2-3+ right lower extremity swelling compared to left with significant posterior ecchymosis extending from the knee down into the posterior aspect of the heel.  No overt bony tenderness, no erythema or warmth.  No tenderness of the medial or lateral malleoli.  Neurological: Alert, attentive, moving all extremities equally.   Skin: Skin is warm and dry.      Emergency Department Course     Imaging:  US Lower extremity venous:   No DVT demonstrated.    Reading per radiology     XR Ankle right :  Mild knee joint effusion. There is a fracture at the   posterior aspect of the proximal tibia with 5 mm of displacement; this   could possibly represent an avulsion fracture which contains the   posterior cruciate ligament insertion.     Reading per radiology      XR knee right:  Mild knee joint effusion. There is a fracture at the   posterior aspect of the proximal tibia with 5 mm of displacement; this   could possibly represent an avulsion fracture which contains the   posterior cruciate ligament insertion.     Reading per radiology      XR tibia and fibula right:  Mild knee joint effusion. There is a fracture at the   posterior aspect of the proximal tibia with 5 mm of displacement; this   could possibly represent an avulsion fracture which contains the   posterior cruciate ligament insertion.     Reading per radiology      Laboratory:  CBC: WBC 5.8, HGB 10.8(L),    CMP: Chloride 110(H), Albumin 3.3(L) o/w WNL (Creatinine: 0.58)    PTT: 25  INR: 1.05     Emergency Department Course:  Reviewed:  I reviewed the patient's nursing notes, vitals, past medical records, Care Everywhere.     Assessments:  1059 I performed an assessment and examination of the patient as noted above.     1230 Findings and plan explained to the  Patient and daughter. Patient discharged home with instructions regarding supportive care, medications, and reasons to return. The importance of close follow-up was reviewed.     Interventions:    Medications   acetaminophen (TYLENOL) tablet 650 mg (has no administration in time range)       Disposition:  The patient was discharged to home.       Impression & Plan   Medical Decision Making:  Abdoulaye Peguero is a 80 year old female not on anticoagulation presenting for right lower extremity swelling with no reported trauma.  On exam she was noted to have ecchymosis in the right posterior aspect of her knee extending down onto the posterior aspect of her foot with no overt effusion or warmth.  Do not suspect infection, ultrasound negative for DVT.  Lab work was sent given the unexplained bleeding however her platelets are within normal limits her hemoglobin was only down about 1 g.  X-rays showed probable avulsion posterior tibial fracture questionably related to PCL though patient does not report any trauma twisting injury or fall.  Do not suspect hemarthrosis or septic arthritis.  Given the x-ray findings, will place her in a knee immobilizer, she does ambulate at home and we will have her use a walker with weightbearing only as tolerated.  I recommended copious use of ice, elevation and follow-up with orthopedics.  Recommended Tylenol as needed for pain.      Diagnosis:    ICD-10-CM    1. Other closed fracture of proximal end of right tibia, initial encounter  S82.191A    2. Traumatic ecchymosis of right lower leg, initial encounter  S80.11XA        Dani Almeida MD  [employer]  3:04 PM 03/05/21     Scribe Disclosure:  Arlette BAZZI, am serving as a scribe at 10:57 AM on 3/5/2021 to document services personally performed by Dani Almeida MD based on my observations and the provider's statements to me.           Dani Almeida MD  03/05/21 6213

## 2021-03-05 NOTE — ED NOTES
Prep patient for discharge, knee immobilizer applied, walker fitting complete, instructions given.  Ambulation trial, patient was able to get self up off the stretcher and walk with and without the walker.  Tolerated well, gait steady, VS complete RN notified

## 2021-03-05 NOTE — ED TRIAGE NOTES
Patient presents with PCA for increased right leg/foot swelling for the past couple days. Denies any SOB/chest pain.

## 2021-03-05 NOTE — PROGRESS NOTES
"Nursing Notes:   Nicole Charlton, RN  3/4/2021  5:48 PM  Signed  Chief Complaint   Patient presents with     Urgent Care     Edema     Her right foot and knee is swollen today.  She has been having some pain recently but the swelling is new.  No recent injury that she knows of.  She rates the pain 7/10.     Initial BP (!) 142/74 (BP Location: Right arm, Patient Position: Sitting, Cuff Size: Adult Regular)   Pulse 88   Temp 97.6  F (36.4  C) (Tympanic)   Resp 12   SpO2 98%  Estimated body mass index is 24.14 kg/m  as calculated from the following:    Height as of 9/25/19: 1.575 m (5' 2\").    Weight as of 8/26/20: 59.9 kg (132 lb)..  BP completed using cuff size: regular  Nicole Charlton R.N.      Chief Complaint   Patient presents with     Urgent Care     Edema     Her right foot and knee is swollen today.  She has been having some pain recently but the swelling is new.  No recent injury that she knows of.  She rates the pain 7/10.     Blood pressure (!) 142/74, pulse 88, temperature 97.6  F (36.4  C), temperature source Tympanic, resp. rate 12, weight 56.7 kg (125 lb), SpO2 98 %, not currently breastfeeding.           TULIO Stanford is a 80 year old  female with a PMH significant for:     Patient Active Problem List   Diagnosis     Chronic headache     Gastroesophageal reflux disease     Hyperlipidemia     Prediabetes     Urinary incontinence     Vitamin D deficiency     Body aches     DDD (degenerative disc disease), lumbar     Osteoarthritis of spine with radiculopathy, thoracolumbar region     Depressed mood     Patient complaining of right lower extremity pain with edema, first noticed few days ago.  Denies any trauma or inciting events.  States that she does have history of right lower extremity swelling, but without pain.  Denies any fevers, chills, chest pain, shortness of breath, weakness.  Does endorse some numbness and tingling.  Has tried Tylenol with some relief of symptoms.    Of " note, patient is here with ILS worker who frequently accompanies patient to her appointment and helps with interpretation. Patient refusing phone .        OBJECTIVE     Vitals:    03/04/21 1741   BP: (!) 142/74   BP Location: Right arm   Patient Position: Sitting   Cuff Size: Adult Regular   Pulse: 88   Resp: 12   Temp: 97.6  F (36.4  C)   TempSrc: Tympanic   SpO2: 98%   Weight: 56.7 kg (125 lb)     Body mass index is 22.86 kg/m .    Constitutional: Awake, alert, cooperative, no acute distress, and appears stated age.  Lungs: No increased WOB, CTABL, no crackles or wheezing appreciated.  Cardiovascular: RRR, normal S1 and S2, no S3 or S4, and no murmur appreciated.  Musculoskeletal: Right lower extremity with 2+ edema to the midshin.  Tenderness to palpation at the medial and lateral malleolus.  Tenderness to palpation at the right medial patella.  Decreased sensation at the anterior tibia.  Normal strength and sensations of the right lower extremity.  Normal ankle flexion/extension.  Normal right knee flexion/extension.  Left lower extremity unremarkable.  2+ DP pulses bilaterally.  Neuropsychiatric: Normal affect, mood, orientation, memory and insight.  Skin: No visible rashes, erythema, pallor, petechia or purpura.    ASSESSMENT AND PLAN     Abdoulaye was seen today for urgent care and edema.    Diagnoses and all orders for this visit:    Pain and swelling of right lower extremity  Concern for DVT. Possible venous insufficiency.  Patient without history of heart failure.  Strongly recommend that patient go to the ED for ultrasound. Explained to patient and her ILS worker that complications from DVT can be fatal. Patient refusing to go to the ED. States that she is tired describes having multiple imaging studies done in the past that did not lead to anything. ILS worker states that she will try and convince the patient to go to the ED. advised patient that if she develops worsening symptoms such as chest  pain, shortness of breath, fevers, worsening right lower extremity pain, that she goes to the emergency room stat.      Return if symptoms worsen or fail to improve.      Rober Gaytan MD  3/4/2021

## 2021-03-10 ENCOUNTER — TRANSFERRED RECORDS (OUTPATIENT)
Dept: HEALTH INFORMATION MANAGEMENT | Facility: CLINIC | Age: 81
End: 2021-03-10

## 2021-04-25 ENCOUNTER — HEALTH MAINTENANCE LETTER (OUTPATIENT)
Age: 81
End: 2021-04-25

## 2021-08-19 ENCOUNTER — TELEPHONE (OUTPATIENT)
Dept: FAMILY MEDICINE | Facility: CLINIC | Age: 81
End: 2021-08-19

## 2021-08-19 NOTE — TELEPHONE ENCOUNTER
[facility] Call Center    Phone Message    May a detailed message be left on voicemail: no     Reason for Call: Other: Arlette with [employer] (incontinence supplier) called to get scrip signed off by Dr. Riggins. They have been having a hard time getting this scrip filled since May. Please call Arlette at [phone] to further discuss. If needed can you please forward to Dr Mcallister at [facility]. Thanks     Action Taken: Other: [facility]    Travel Screening: Not Applicable

## 2021-08-25 ENCOUNTER — TELEPHONE (OUTPATIENT)
Dept: OBGYN | Facility: CLINIC | Age: 81
End: 2021-08-25

## 2021-08-25 NOTE — TELEPHONE ENCOUNTER
Call received from patient's daughter, Holly. Forms faxed for incontinence supplies and dietary forms for county that need to be completed and faxed by 8/31/21. Forms have been received and placed in Dr. Mcallister's box for completion.     Holly also requests that copies be emailed to email address on file. Request added to forms. Advised that forms have been received and will be faxed once completed.

## 2021-08-31 ENCOUNTER — TELEPHONE (OUTPATIENT)
Dept: OBGYN | Facility: CLINIC | Age: 81
End: 2021-08-31

## 2021-08-31 NOTE — TELEPHONE ENCOUNTER
Tried to reach Arlette at [facility],  but received voicemail.  Left message that Dr Mcallister has not seen patient in over a year, and not for this reason.  Does not feel that she can complete order as requested.  Pt has appointment scheduled on 9/15 to establish care elsewhere.

## 2021-10-10 ENCOUNTER — HEALTH MAINTENANCE LETTER (OUTPATIENT)
Age: 81
End: 2021-10-10

## 2022-02-03 ENCOUNTER — APPOINTMENT (OUTPATIENT)
Dept: INTERPRETER SERVICES | Facility: CLINIC | Age: 82
End: 2022-02-03
Payer: MEDICARE

## 2022-02-04 ENCOUNTER — LAB (OUTPATIENT)
Dept: LAB | Facility: CLINIC | Age: 82
End: 2022-02-04
Attending: FAMILY MEDICINE
Payer: MEDICARE

## 2022-02-04 ENCOUNTER — OFFICE VISIT (OUTPATIENT)
Dept: FAMILY MEDICINE | Facility: CLINIC | Age: 82
End: 2022-02-04
Attending: FAMILY MEDICINE
Payer: MEDICARE

## 2022-02-04 DIAGNOSIS — R73.03 PREDIABETES: ICD-10-CM

## 2022-02-04 DIAGNOSIS — Z78.0 ASYMPTOMATIC MENOPAUSAL STATE: ICD-10-CM

## 2022-02-04 DIAGNOSIS — H92.02 CHRONIC LEFT EAR PAIN: ICD-10-CM

## 2022-02-04 DIAGNOSIS — H57.12 LEFT EYE PAIN: ICD-10-CM

## 2022-02-04 DIAGNOSIS — E78.3 HYPERCHYLOMICRONEMIA: ICD-10-CM

## 2022-02-04 DIAGNOSIS — K21.9 GASTROESOPHAGEAL REFLUX DISEASE WITHOUT ESOPHAGITIS: ICD-10-CM

## 2022-02-04 DIAGNOSIS — G89.29 CHRONIC LEFT EAR PAIN: ICD-10-CM

## 2022-02-04 DIAGNOSIS — R29.6 FALLS FREQUENTLY: ICD-10-CM

## 2022-02-04 DIAGNOSIS — Z13.820 SCREENING FOR OSTEOPOROSIS: ICD-10-CM

## 2022-02-04 DIAGNOSIS — E78.00 HYPERCHOLESTEROLEMIA: Primary | ICD-10-CM

## 2022-02-04 DIAGNOSIS — Z00.00 ENCOUNTER FOR PREVENTIVE CARE: ICD-10-CM

## 2022-02-04 DIAGNOSIS — E78.00 HYPERCHOLESTEROLEMIA: ICD-10-CM

## 2022-02-04 DIAGNOSIS — E55.9 VITAMIN D DEFICIENCY: ICD-10-CM

## 2022-02-04 LAB
CHOLEST SERPL-MCNC: 201 MG/DL
DEPRECATED CALCIDIOL+CALCIFEROL SERPL-MC: 18 UG/L (ref 20–75)
FASTING STATUS PATIENT QL REPORTED: NO
HBA1C MFR BLD: 5.5 % (ref 0–5.6)
HDLC SERPL-MCNC: 55 MG/DL
LDLC SERPL CALC-MCNC: 122 MG/DL
NONHDLC SERPL-MCNC: 146 MG/DL
TRIGL SERPL-MCNC: 119 MG/DL

## 2022-02-04 PROCEDURE — G0463 HOSPITAL OUTPT CLINIC VISIT: HCPCS

## 2022-02-04 PROCEDURE — 36415 COLL VENOUS BLD VENIPUNCTURE: CPT

## 2022-02-04 PROCEDURE — 90732 PPSV23 VACC 2 YRS+ SUBQ/IM: CPT

## 2022-02-04 PROCEDURE — 250N000011 HC RX IP 250 OP 636

## 2022-02-04 PROCEDURE — 82306 VITAMIN D 25 HYDROXY: CPT

## 2022-02-04 PROCEDURE — 83036 HEMOGLOBIN GLYCOSYLATED A1C: CPT

## 2022-02-04 PROCEDURE — G0009 ADMIN PNEUMOCOCCAL VACCINE: HCPCS

## 2022-02-04 PROCEDURE — 99215 OFFICE O/P EST HI 40 MIN: CPT | Performed by: FAMILY MEDICINE

## 2022-02-04 PROCEDURE — 80061 LIPID PANEL: CPT

## 2022-02-04 RX ORDER — ERYTHROMYCIN 5 MG/G
OINTMENT OPHTHALMIC
COMMUNITY
Start: 2022-01-25 | End: 2024-09-16

## 2022-02-04 RX ORDER — CONJUGATED ESTROGENS 0.62 MG/G
CREAM VAGINAL
COMMUNITY
Start: 2022-02-01 | End: 2022-11-29

## 2022-02-04 RX ORDER — ATORVASTATIN CALCIUM 20 MG/1
20 TABLET, FILM COATED ORAL DAILY
Qty: 90 TABLET | Refills: 3 | Status: SHIPPED | OUTPATIENT
Start: 2022-02-04 | End: 2023-03-02

## 2022-02-04 RX ORDER — ESTRADIOL 0.1 MG/G
1 CREAM VAGINAL
COMMUNITY
Start: 2022-02-01

## 2022-02-04 NOTE — LETTER
2/4/2022       RE: Abdoulaye Peguero  [address]  [place] [address]     Dear Colleague,    Thank you for referring your patient, Abdoulaye Peguero, to the [facility] [place] at [facility]. Please see a copy of my visit note below.    Abdoulaye is a 81 year old No obstetric history on file. female who presents for annual exam.   She was last seen 8/2020  [place]  on audio    Chronic conditions/additional problems: .  Lives alone, has PCA, someone she knows  1. Hypercholesterol--on lipitor  2. OA spine, DDD back and neck  3. PreDM-diet managed  4. Urinary incontinence  5. Cystocele, uterine prolapse--treated by ob/gyn with  pessary  6. Recurrent L ear pain for years, possible recurrent impacted cerumen  7 Chronic L eye pain, drops not helping  8 s/p R tibia fracture 3/2021, ? Fall,     HEALTH MAINTENANCE:    Breast Health  Last Mammo: unknown, Result: Not applicable, Next Mammo: n/a   Family Hx breast cancer:none    Gynecologic Health:  No LMP recorded. Patient is postmenopausal..  OB History   No obstetric history on file.     Pap: n/a by guidelines  Hot flashes/night sweats no  Vaginal concerns: see above  Urinary Concerns: pads for incontinence, has enough for now  Sexual Health:  --Patient is not sexually active with partner(s)  ----contraception: not needed    Bone Health  DEXA:  due  Dairy/Vitamin D--due for check, not taking supplement  Physical activity--niiminal  Family history osteoporosis:    CV risk  Cholesterol: (  Cholesterol   Date Value Ref Range Status   08/26/2020 206 (H) <200 mg/dL Final     Comment:     Desirable:       <200 mg/dl   06/26/2019 227 (H) <200 mg/dL Final     Comment:     Desirable:       <200 mg/dl      DM screen: due for hgbA1c   reports that she has never smoked. She has never used smokeless tobacco.    Eligible for Lung Cancer screening: n/a  Eligibility Criteria:     Asymptomatic - no signs or  symptoms of lung cancer     Age 55 - 79 years (55 - 77 years for Medicare patients)     Current or Former Smoker; if former, must have quit within 15 years     30 + pack-year smoking history (# of packs per day   x  # of years smoking)     Colon Cancer n/a by guidelines    Immunizations:  Immunizations up to date EXCEPT:  States had Flu vaccine before covid vaccine--  Pneumovax  Due Shingrex--declines for now  Due dTaP--declines for now    History of falls, uses cane    @  Patient Active Problem List   Diagnosis     Chronic headache     Gastroesophageal reflux disease     Hyperlipidemia     Prediabetes     Urinary incontinence     Vitamin D deficiency     Body aches     DDD (degenerative disc disease), lumbar     Osteoarthritis of spine with radiculopathy, thoracolumbar region     Depressed mood     No past surgical history on file.   Social History     Tobacco Use     Smoking status: Never Smoker     Smokeless tobacco: Never Used   Substance Use Topics     Alcohol use: Never      *Family history is unknown by patient.            Current Outpatient Medications   Medication Sig     erythromycin (ROMYCIN) 5 MG/GM ophthalmic ointment SMARTSIG:In Eye(s)     estradiol (ESTRACE) 0.1 MG/GM vaginal cream Place 1 g vaginally     acetaminophen (TYLENOL) 650 MG CR tablet Take 1 tablet (650 mg) by mouth every 8 hours as needed for mild pain or fever     atorvastatin (LIPITOR) 20 MG tablet Take 1 tablet (20 mg) by mouth daily     dextran 70-hypromellose (TEARS NATURALE FREE PF) 0.1-0.3 % ophthalmic solution Place 2 drops into both eyes daily (Patient not taking: Reported on 3/4/2021)     glycerin-hypromellose- (VISINE) 0.2-0.2-1 % SOLN ophthalmic solution Place 1 drop into both eyes 4 times daily (Patient not taking: Reported on 3/4/2021)     POLYMYXIN B-TRIMETHOPRIM OP Apply 1 drop to eye 4 times daily     PREMARIN 0.625 MG/GM vaginal cream      salicylic acid (P&S) 2 % external shampoo Shampoo with small amount  1-2x/week     No current facility-administered medications for this visit.       No Known Allergies    Past medical, surgical, social and family history were reviewed and updated in [facility].    Last PHQ-9 score on record= No flowsheet data found.  Last GAD7 score on record=   TRAY-7 SCORE 9/25/2019   Total Score 2       ROS:   Review of Systems   L ear always painful x 8 years, would like to see specialist  L eye painful, drops not helpful, many, vision ok   Mid chest pain when eat food--  Resp--If walk around, dyspnea or if talking, not all the time  Memory. Forget a lot  Neuro--sometimes can't lift legs, need cane, someitme hard to lift head  GI--constipation, better with veggies, rest neg.      EXAM:.  EXAM:  EXAM:  Constitutional: healthy, alert and no distress   Cardiovascular: negative, PMI normal. No lifts, heaves, or thrills. RRR. No murmurs, clicks gallops or rub  Respiratory: negative, Percussion normal. Good diaphragmatic excursion. Lungs clear  Psychiatric: mentation appears normal and affect normal/bright, memory not formally assessed but remembers types of medications and whether she is taking or not, recalls events of past year.  Neck: Neck supple. No adenopathy. Thyroid symmetric, normal size,, Carotids without bruits.  ENT: R TM normal, L ear canal obscured by severely impacted cerumen  Abdomen: Abdomen soft, mild epigasric tenderness. BS normal. No masses, organomegaly  NEURO: Gait normal. Reflexes normal and symmetric. Sensation grossly WNL.  SKIN: no suspicious lesions or rashes  LYMPH: Normal cervical lymph nodes     .  ASSESSMENT:  81 year old female with satisfactory annual exam.  No diagnosis found.    PLAN:  1. HCM  Immunizations--  Pneumovax today  Re-Address dTaP and Shingrex at future visits    Cancer screening--Up to date    CV risk- see below      Bone Health--DEXA scan, check Vitamin D  Likely will need to restart Vit D supplement    2. Recurrent L ear pain  Due to impacted cerumen, refer  ENT, will need maintenance regiment to share with PCA    3. L eye pain, chronic, refer to optho    4. Pre DM--hgbA1c  5. hypercholesterol --continue statin, check lipids  6 GERD--restart omeprazole 20  Mg daily  7. Falls and recent fracture--refer PT      Follow-up 3-4 months      COUNSELING:Appropriate preventive services were discussed with this patient, including applicable screening as appropriate for cardiovascular disease, diabetes, osteopenia/osteoporosis, and glaucoma.  As appropriate for age/gender, discussed screening for colorectal cancer, prostate cancer, breast cancer, and cervical cancer. Checklist reviewing preventive services available has been given to the patient.      Counseling Resources:  ATP IV Guidelines  Pooled Cohorts Equation Calculator  Breast Cancer Risk Calculator  FRAX Risk Assessment  ICSI Preventive Guidelines  Dietary Guidelines for Americans, 2010  [employer] MyPlate  ASA Prophylaxis  Lung CA Screening    Sai Mcallister MD  [facility] [place]          Again, thank you for allowing me to participate in the care of your patient.      Sincerely,    Sai Mcallister MD       DISCHARGE

## 2022-02-04 NOTE — PROGRESS NOTES
Abdoulaye is a 81 year old No obstetric history on file. female who presents for annual exam.   She was last seen 8/2020  [place]  on audio    Chronic conditions/additional problems: .  Lives alone, has PCA, someone she knows  1. Hypercholesterol--on lipitor  2. OA spine, DDD back and neck  3. PreDM-diet managed  4. Urinary incontinence  5. Cystocele, uterine prolapse--treated by ob/gyn with  pessary  6. Recurrent L ear pain for years, possible recurrent impacted cerumen  7 Chronic L eye pain, drops not helping  8 s/p R tibia fracture 3/2021, ? Fall,     HEALTH MAINTENANCE:    Breast Health  Last Mammo: unknown, Result: Not applicable, Next Mammo: n/a   Family Hx breast cancer:none    Gynecologic Health:  No LMP recorded. Patient is postmenopausal..  OB History   No obstetric history on file.     Pap: n/a by guidelines  Hot flashes/night sweats no  Vaginal concerns: see above  Urinary Concerns: pads for incontinence, has enough for now  Sexual Health:  --Patient is not sexually active with partner(s)  ----contraception: not needed    Bone Health  DEXA:  due  Dairy/Vitamin D--due for check, not taking supplement  Physical activity--niiminal  Family history osteoporosis:    CV risk  Cholesterol: (  Cholesterol   Date Value Ref Range Status   08/26/2020 206 (H) <200 mg/dL Final     Comment:     Desirable:       <200 mg/dl   06/26/2019 227 (H) <200 mg/dL Final     Comment:     Desirable:       <200 mg/dl      DM screen: due for hgbA1c   reports that she has never smoked. She has never used smokeless tobacco.    Eligible for Lung Cancer screening: n/a  Eligibility Criteria:     Asymptomatic - no signs or symptoms of lung cancer     Age 55 - 79 years (55 - 77 years for Medicare patients)     Current or Former Smoker; if former, must have quit within 15 years     30 + pack-year smoking history (# of packs per day   x  # of years smoking)     Colon Cancer n/a by guidelines    Immunizations:  Immunizations up to date  EXCEPT:  States had Flu vaccine before covid vaccine--  Pneumovax  Due Shingrex--declines for now  Due dTaP--declines for now    History of falls, uses cane    @  Patient Active Problem List   Diagnosis     Chronic headache     Gastroesophageal reflux disease     Hyperlipidemia     Prediabetes     Urinary incontinence     Vitamin D deficiency     Body aches     DDD (degenerative disc disease), lumbar     Osteoarthritis of spine with radiculopathy, thoracolumbar region     Depressed mood     No past surgical history on file.   Social History     Tobacco Use     Smoking status: Never Smoker     Smokeless tobacco: Never Used   Substance Use Topics     Alcohol use: Never      *Family history is unknown by patient.            Current Outpatient Medications   Medication Sig     erythromycin (ROMYCIN) 5 MG/GM ophthalmic ointment SMARTSIG:In Eye(s)     estradiol (ESTRACE) 0.1 MG/GM vaginal cream Place 1 g vaginally     acetaminophen (TYLENOL) 650 MG CR tablet Take 1 tablet (650 mg) by mouth every 8 hours as needed for mild pain or fever     atorvastatin (LIPITOR) 20 MG tablet Take 1 tablet (20 mg) by mouth daily     dextran 70-hypromellose (TEARS NATURALE FREE PF) 0.1-0.3 % ophthalmic solution Place 2 drops into both eyes daily (Patient not taking: Reported on 3/4/2021)     glycerin-hypromellose- (VISINE) 0.2-0.2-1 % SOLN ophthalmic solution Place 1 drop into both eyes 4 times daily (Patient not taking: Reported on 3/4/2021)     POLYMYXIN B-TRIMETHOPRIM OP Apply 1 drop to eye 4 times daily     PREMARIN 0.625 MG/GM vaginal cream      salicylic acid (P&S) 2 % external shampoo Shampoo with small amount 1-2x/week     No current facility-administered medications for this visit.       No Known Allergies    Past medical, surgical, social and family history were reviewed and updated in [facility].    Last PHQ-9 score on record= No flowsheet data found.  Last GAD7 score on record=   TRAY-7 SCORE 9/25/2019   Total Score 2       ROS:    Review of Systems   L ear always painful x 8 years, would like to see specialist  L eye painful, drops not helpful, many, vision ok   Mid chest pain when eat food--  Resp--If walk around, dyspnea or if talking, not all the time  Memory. Forget a lot  Neuro--sometimes can't lift legs, need cane, someitme hard to lift head  GI--constipation, better with veggies, rest neg.      EXAM:.  EXAM:  EXAM:  Constitutional: healthy, alert and no distress   Cardiovascular: negative, PMI normal. No lifts, heaves, or thrills. RRR. No murmurs, clicks gallops or rub  Respiratory: negative, Percussion normal. Good diaphragmatic excursion. Lungs clear  Psychiatric: mentation appears normal and affect normal/bright, memory not formally assessed but remembers types of medications and whether she is taking or not, recalls events of past year.  Neck: Neck supple. No adenopathy. Thyroid symmetric, normal size,, Carotids without bruits.  ENT: R TM normal, L ear canal obscured by severely impacted cerumen  Abdomen: Abdomen soft, mild epigasric tenderness. BS normal. No masses, organomegaly  NEURO: Gait normal. Reflexes normal and symmetric. Sensation grossly WNL.  SKIN: no suspicious lesions or rashes  LYMPH: Normal cervical lymph nodes     .  ASSESSMENT:  81 year old female with satisfactory annual exam.  No diagnosis found.    PLAN:  1. HCM  Immunizations--  Pneumovax today  Re-Address dTaP and Shingrex at future visits    Cancer screening--Up to date    CV risk- see below      Bone Health--DEXA scan, check Vitamin D  Likely will need to restart Vit D supplement    2. Recurrent L ear pain  Due to impacted cerumen, refer ENT, will need maintenance regiment to share with PCA    3. L eye pain, chronic, refer to optho    4. Pre DM--hgbA1c  5. hypercholesterol --continue statin, check lipids  6 GERD--restart omeprazole 20  Mg daily  7. Falls and recent fracture--refer PT      Follow-up 3-4 months      COUNSELING:Appropriate preventive  services were discussed with this patient, including applicable screening as appropriate for cardiovascular disease, diabetes, osteopenia/osteoporosis, and glaucoma.  As appropriate for age/gender, discussed screening for colorectal cancer, prostate cancer, breast cancer, and cervical cancer. Checklist reviewing preventive services available has been given to the patient.      Counseling Resources:  ATP IV Guidelines  Pooled Cohorts Equation Calculator  Breast Cancer Risk Calculator  FRAX Risk Assessment  ICSI Preventive Guidelines  Dietary Guidelines for Americans, 2010  USDA's MyPlate  ASA Prophylaxis  Lung CA Screening    Sai Mcallister MD  [facility] [place]

## 2022-02-04 NOTE — LETTER
Date:February 6, 2022      Patient was self referred, no letter generated. Do not send.        [facility] Health Information

## 2022-02-18 DIAGNOSIS — E55.9 VITAMIN D DEFICIENCY: Primary | ICD-10-CM

## 2022-02-18 RX ORDER — MULTIVIT-MIN/IRON/FOLIC ACID/K 18-600-40
1 CAPSULE ORAL DAILY
Qty: 90 CAPSULE | Refills: 1 | Status: SHIPPED | OUTPATIENT
Start: 2022-02-18 | End: 2024-09-16

## 2022-02-18 NOTE — RESULT ENCOUNTER NOTE
Team - please contact patient. Vitamin D levels are quite low. I have sent in a prescription for Vitamin D 2000 units daily. We will recheck her vitamin D at her next visit in 3- 4 months.

## 2022-03-09 ENCOUNTER — APPOINTMENT (OUTPATIENT)
Dept: INTERPRETER SERVICES | Facility: CLINIC | Age: 82
End: 2022-03-09
Payer: MEDICARE

## 2022-03-09 NOTE — RESULT ENCOUNTER NOTE
Left voicemail for Abdoulaye asking her to call clinic to confirm that she received messages about her vitamin D deficiency and supplementation.    Called pharmacy, and Abdoulaye did  her prescription shortly after the order was placed.

## 2022-05-21 ENCOUNTER — HEALTH MAINTENANCE LETTER (OUTPATIENT)
Age: 82
End: 2022-05-21

## 2022-06-29 ENCOUNTER — LAB (OUTPATIENT)
Dept: LAB | Facility: CLINIC | Age: 82
End: 2022-06-29
Attending: FAMILY MEDICINE
Payer: MEDICARE

## 2022-06-29 ENCOUNTER — OFFICE VISIT (OUTPATIENT)
Dept: FAMILY MEDICINE | Facility: CLINIC | Age: 82
End: 2022-06-29
Attending: FAMILY MEDICINE
Payer: MEDICARE

## 2022-06-29 VITALS
WEIGHT: 135 LBS | HEART RATE: 79 BPM | BODY MASS INDEX: 24.69 KG/M2 | DIASTOLIC BLOOD PRESSURE: 72 MMHG | SYSTOLIC BLOOD PRESSURE: 123 MMHG

## 2022-06-29 DIAGNOSIS — G89.29 OTHER CHRONIC PAIN: ICD-10-CM

## 2022-06-29 DIAGNOSIS — R35.0 URINARY FREQUENCY: ICD-10-CM

## 2022-06-29 DIAGNOSIS — R06.00 DYSPNEA, UNSPECIFIED TYPE: ICD-10-CM

## 2022-06-29 DIAGNOSIS — M79.10 MYALGIA: ICD-10-CM

## 2022-06-29 DIAGNOSIS — K21.9 GASTROESOPHAGEAL REFLUX DISEASE WITHOUT ESOPHAGITIS: ICD-10-CM

## 2022-06-29 DIAGNOSIS — M79.10 MYALGIA: Primary | ICD-10-CM

## 2022-06-29 LAB
CRP SERPL-MCNC: <2.9 MG/L (ref 0–8)
ERYTHROCYTE [SEDIMENTATION RATE] IN BLOOD BY WESTERGREN METHOD: 29 MM/HR (ref 0–30)

## 2022-06-29 PROCEDURE — G0463 HOSPITAL OUTPT CLINIC VISIT: HCPCS

## 2022-06-29 PROCEDURE — 85652 RBC SED RATE AUTOMATED: CPT

## 2022-06-29 PROCEDURE — 36415 COLL VENOUS BLD VENIPUNCTURE: CPT

## 2022-06-29 PROCEDURE — 86140 C-REACTIVE PROTEIN: CPT

## 2022-06-29 PROCEDURE — 99417 PROLNG OP E/M EACH 15 MIN: CPT | Performed by: FAMILY MEDICINE

## 2022-06-29 PROCEDURE — 99215 OFFICE O/P EST HI 40 MIN: CPT | Performed by: FAMILY MEDICINE

## 2022-06-29 RX ORDER — PANTOPRAZOLE SODIUM 40 MG/1
40 TABLET, DELAYED RELEASE ORAL DAILY
Qty: 90 TABLET | Refills: 0 | Status: SHIPPED | OUTPATIENT
Start: 2022-06-29 | End: 2022-11-29

## 2022-06-29 ASSESSMENT — PAIN SCALES - GENERAL: PAINLEVEL: MODERATE PAIN (5)

## 2022-06-29 NOTE — PROGRESS NOTES
Assessment & Plan   Problem List Items Addressed This Visit        Medium    Gastroesophageal reflux disease    Relevant Medications    pantoprazole (PROTONIX) 40 MG EC tablet      Other Visit Diagnoses     Myalgia    -  Primary    Relevant Orders    C-Reactive Protein, Inflammatory (Completed)    RBC Sedimentation Rate (Completed)    Pain Management Referral    Dyspnea, unspecified type        Relevant Orders    Echocardiogram Complete    Urinary frequency                 75 minutes spent on the date of the encounter doing chart review, review of test results, patient visit and documentation            No follow-ups on file.    Sai Mcallister MD  [facility] [place] Stanford is a 82 year old accompanied by her video ., presenting for the following health issues:  Back Pain      HPI     1. Hypercholesterol--wants to check cholesterol again.  States is taking medication (atorvastatin 20 mg), reviewed lab from 4 months ago  No side effects from medication     2. Chronic pain, multiple sites  History is somewhat vague in terms of location and type of pain  Pain is from Right shoulder all the way down to right foot;  same pain that has been going on for years  Pain located in muscles more than joints, cannot describe pain  tizandine helps temporily but pain  comes and goes, 5-6/10, (10 the worst pain on scale)    She has known DDD thoracolumbar spine, numbness in L mid foot to knee x 10 years, not new    3. Wonders if can have Bactrim at home to help with intermittent urinary frequency, pinching pain with urination  Lasts 2-3 days, goes away by itself, happens every 2-4 weeks  Had Bactim once for UTI; Has pessary, on vaginal estrogen    4. Dyspnea--New, going x 4-5 months  Gradual in onset  Occurs immediately after eating, unrelated to type of food, worse if eats to complete fullness  Can occur with walking  No chest pain, no abdominal pain  On omeprazole;   In  reviewing chart has described somewhat similar symptoms back in 2018 (2/19/2018)    Current Outpatient Medications   Medication     acetaminophen (TYLENOL) 650 MG CR tablet     atorvastatin (LIPITOR) 20 MG tablet     Cholecalciferol (VITAMIN D) 50 MCG (2000 UT) CAPS     erythromycin (ROMYCIN) 5 MG/GM ophthalmic ointment     estradiol (ESTRACE) 0.1 MG/GM vaginal cream     omeprazole (PRILOSEC) 20 MG DR capsule     POLYMYXIN B-TRIMETHOPRIM OP     PREMARIN 0.625 MG/GM vaginal cream     salicylic acid (P&S) 2 % external shampoo     No current facility-administered medications for this visit.       Review of Systems   Per HPI      Objective    /72 (BP Location: Right arm)   Pulse 79   Wt 61.2 kg (135 lb)   BMI 24.69 kg/m    Body mass index is 24.69 kg/m .  Physical Exam   EXAM:  Constitutional: healthy, alert and no distress   Cardiovascular: negative, PMI normal. No lifts, heaves, or thrills. RRR. No murmurs, clicks gallops or rub  Respiratory: negative, Percussion normal. Good diaphragmatic excursion. Lungs clear  Abdomen: negative findings: no organomegaly, bowel sounds normal, positive findings: tenderness moderate epigastric  No joint sweeling, no LE edema      A/P  1.  Chronic diffuse pain, right side--myalgia  Has seen orthopedics, appear more myalagia than arthritis  Check CRP, ESR  Continue tizanidine  Given patient is elderly, and either alone or at day care setting much of time, at risk of falls-goal to limit sedating medications  Refer to pain clinic for options on pain management    2. Intermittent urinary frequency  -Instructed to -push fluids if happens, if symptoms not going away, come into clinic    3. Dyspena with eating  4. GERD  Unclear if this is presentation of gastritis/GERD or CV presentation  Echocardiogram  discharge omeprazole  Start Protonix 40 mg daily  Consider GI referral  +/- cardiology referral if symptoms persist    5. Cholesterol   Reviewed with patient that not due for lab  test, that medication will be needed chronically      Per patient, her adult child sets up medication, but unclear who checks that she takes them regularly or how information is conveyed from her visit to family caregivers.  Recommend care coordination and that family caregiver comes to each visit with her, and inperson .    Follow-up 3 months  ..

## 2022-07-05 ENCOUNTER — PATIENT OUTREACH (OUTPATIENT)
Dept: CARE COORDINATION | Facility: CLINIC | Age: 82
End: 2022-07-05

## 2022-07-05 NOTE — PROGRESS NOTES
Clinic Care Coordination Contact    Situation: Patient chart reviewed by care coordinator.    Background: MALRO CC received care coordination referral from PCP, Sai Mcallister, at [facility].  Referral stated: 82 year old [place]romo speaking woman with cognitive impairment; comes to clinic alone; adult children caregiving and doing med set up per pt. Unclear how to coordinate care from visit with caregivers--need them at every visit.    Assessment: MARLO CC reviewed chart and insurance type.  Patient may already have access to a care coordinator through Medicare Healthplan, [employer], [employer] - MARLO plans to inquire about this with patient/caregiver of patient, in additiona exploring home care services/[employer] assessment for Elderly Waiver.  MARLO CC attempted to gain [place]  through [facility], however, no  was available for phone call.     Plan/Recommendations: MARLO CC to attempt outreach again.       MELIDA Marcial ([place])  , Care Coordination  [facility]  [facility]  [facility]  [email or web address]   Office: [phone]

## 2022-07-08 NOTE — RESULT ENCOUNTER NOTE
Dear Abdoulaye,     Here are your recent results which are within the expected normal range. Please continue with your current plan of care and let us know if you have any questions or concerns.    Regards,  Sai Mcallister MD

## 2022-07-10 ENCOUNTER — HOSPITAL ENCOUNTER (EMERGENCY)
Facility: CLINIC | Age: 82
Discharge: HOME | End: 2022-07-10
Attending: INTERNAL MEDICINE | Admitting: INTERNAL MEDICINE
Payer: MEDICARE

## 2022-07-10 VITALS
TEMPERATURE: 97.9 F | HEIGHT: 63 IN | DIASTOLIC BLOOD PRESSURE: 100 MMHG | SYSTOLIC BLOOD PRESSURE: 164 MMHG | OXYGEN SATURATION: 95 % | RESPIRATION RATE: 16 BRPM | WEIGHT: 140 LBS | HEART RATE: 81 BPM | BODY MASS INDEX: 24.8 KG/M2

## 2022-07-10 DIAGNOSIS — H60.92 OTITIS EXTERNA OF LEFT EAR, UNSPECIFIED CHRONICITY, UNSPECIFIED TYPE: ICD-10-CM

## 2022-07-10 PROCEDURE — 250N000013 HC RX MED GY IP 250 OP 250 PS 637: Performed by: INTERNAL MEDICINE

## 2022-07-10 PROCEDURE — 99283 EMERGENCY DEPT VISIT LOW MDM: CPT

## 2022-07-10 RX ORDER — HYDROCODONE BITARTRATE AND ACETAMINOPHEN 5; 325 MG/1; MG/1
1 TABLET ORAL EVERY 6 HOURS PRN
Qty: 10 TABLET | Refills: 0 | Status: SHIPPED | OUTPATIENT
Start: 2022-07-10 | End: 2022-07-13

## 2022-07-10 RX ORDER — NEOMYCIN SULFATE, POLYMYXIN B SULFATE AND HYDROCORTISONE 10; 3.5; 1 MG/ML; MG/ML; [USP'U]/ML
4 SUSPENSION/ DROPS AURICULAR (OTIC) 4 TIMES DAILY
Qty: 10 ML | Refills: 0 | Status: SHIPPED | OUTPATIENT
Start: 2022-07-10 | End: 2022-11-29

## 2022-07-10 RX ORDER — HYDROCODONE BITARTRATE AND ACETAMINOPHEN 5; 325 MG/1; MG/1
1 TABLET ORAL ONCE
Status: DISCONTINUED | OUTPATIENT
Start: 2022-07-10 | End: 2022-07-10 | Stop reason: HOSPADM

## 2022-07-10 RX ORDER — CIPROFLOXACIN AND DEXAMETHASONE 3; 1 MG/ML; MG/ML
4 SUSPENSION/ DROPS AURICULAR (OTIC) 2 TIMES DAILY
Qty: 7.5 ML | Refills: 0 | Status: SHIPPED | OUTPATIENT
Start: 2022-07-10 | End: 2022-07-20

## 2022-07-10 RX ADMIN — HYDROCODONE BITARTRATE AND ACETAMINOPHEN 1 TABLET: 5; 325 TABLET ORAL at 14:10

## 2022-07-10 ASSESSMENT — ENCOUNTER SYMPTOMS
EYE PAIN: 1
MYALGIAS: 1

## 2022-07-10 NOTE — DISCHARGE INSTRUCTIONS
"Discharge Instructions  Otitis Externa    Today you were seen for otitis externa which is a condition that occurs when the ear canal, which is the area that leads from the outer ear to the ear drum, becomes irritated as a result of an infection, allergy or skin problem.   The most common symptoms of this are:  pain in the outer ear, especially when the ear is moved, itchiness of the ear, fluid or pus leaking from the ear and difficulty hearing clearly.  \"Swimmer's ear\" is the name for external otitis that occurs in a person who swims frequently.    Treatment:  Treatment of otitis externa aims to reduce pain and eliminate the infection.   You should take either Advil  (ibuprofen) or Tylenol  (acetaminophen) for control of the ear pain.    Ear drops -- Ear drops are usually prescribed to both reduce pain and swelling and kill the bacteria which causes external otitis. It is important to apply the ear drops correctly so that they reach the ear canal:  Lie on your side or tilt your head towards the opposite shoulder.  Fill the ear canal with drops.  Lie on your side for 20 minutes or place a cotton ball in the ear canal for 20 minutes.  Finish the entire course of treatment, even if you begin to feel better within a few days.  Avoid getting ears wet -- during treatment, you should avoid getting the inside of your ears wet. While showering, you can place a cotton ball coated with petroleum jelly in the ear. However, you should not swim for 7 to 10 days after starting treatment. Avoid wearing hearing aids and in-ear headphones until pain improves.  If a wick has been placed in your ear, please do not remove it until seen by your primary doctor or ENT as directed.    Prevention:  Do not clean ear canal. Ear wax serves to protect the ears from water, bacteria, and injury. Excessive cleaning or scratching can injure the skin, potentially leading to infection.  Swimming on a regular basis removes some of the ear wax, allowing " water to soften the skin. Bacteria, which normally live in the ear canal, can then enter the skin more easily.  Wearing devices that block the ear canals, such as hearing aids, headphones, or ear plugs, can increase the risk of external otitis (if worn frequently) by injuring the skin.    If you swim frequently, experts recommend the following tips to reduce the chance of developing external otitis:  Shake your ears dry after swimming.  Use ear drops after swimming to prevent ear infections; these are available at most pharmacies without a prescription.  Consider wearing ear plugs made for swimming.    If your symptoms are not improving in 36 - 48 hours you should be seen by your primary doctor or in an Urgent Care or Emergency Department.  If you develop a high fever or worsening pain, please return to the Emergency Department for further evaluation.  If you were given a prescription for medicine here today, be sure to read all of the information (including the package insert) that comes with your prescription.  This will include important information about the medicine, its side effects, and any warnings that you need to know about.  The pharmacist who fills the prescription can provide more information and answer questions you may have about the medicine.  If you have questions or concerns that the pharmacist cannot address, please call or return to the Emergency Department.   Opioid Medication Information    Pain medications are among the most commonly prescribed medicines, so we are including this information for all our patients. If you did not receive pain medication or get a prescription for pain medicine, you can ignore it.     You may have been given a prescription for an opioid (narcotic) pain medicine and/or have received a pain medicine while here in the Emergency Department. These medicines can make you drowsy or impaired. You must not drive, operate dangerous equipment, or engage in any other  dangerous activities while taking these medications. If you drive while taking these medications, you could be arrested for DUI, or driving under the influence. Do not drink any alcohol while you are taking these medications.     Opioid pain medications can cause addiction. If you have a history of chemical dependency of any type, you are at a higher risk of becoming addicted to pain medications.  Only take these prescribed medications to treat your pain when all other options have been tried. Take it for as short a time and as few doses as possible. Store your pain pills in a secure place, as they are frequently stolen and provide a dangerous opportunity for children or visitors in your house to start abusing these powerful medications. We will not replace any lost or stolen medicine.  As soon as your pain is better, you should flush all your remaining medication.     Many prescription pain medications contain Tylenol  (acetaminophen), including Vicodin , Tylenol #3 , Norco , Lortab , and Percocet .  You should not take any extra pills of Tylenol  if you are using these prescription medications or you can get very sick.  Do not ever take more than 3000 mg of acetaminophen in any 24 hour period.    All opioids tend to cause constipation. Drink plenty of water and eat foods that have a lot of fiber, such as fruits, vegetables, prune juice, apple juice and high fiber cereal.  Take a laxative if you don t move your bowels at least every other day. Miralax , Milk of Magnesia, Colace , or Senna  can be used to keep you regular.      Remember that you can always come back to the Emergency Department if you are not able to see your regular doctor in the amount of time listed above, if you get any new symptoms, or if there is anything that worries you.

## 2022-07-10 NOTE — ED TRIAGE NOTES
Pt presents for complaint of left ear pain. Pt states pain started x1 week ago. Describes lose of hearing in that ear as well. Pt states had something similar x6 months ago, saw ENT and had a large amount of ear wax. States they were not able to clean out the ear due to the pain. ABC intact, A&Ox4.     Triage Assessment     Row Name 07/10/22 2744       Triage Assessment (Adult)    Airway WDL WDL       Respiratory WDL    Respiratory WDL WDL       Skin Circulation/Temperature WDL    Skin Circulation/Temperature WDL WDL       Cardiac WDL    Cardiac WDL WDL       Peripheral/Neurovascular WDL    Peripheral Neurovascular WDL WDL       Cognitive/Neuro/Behavioral WDL    Cognitive/Neuro/Behavioral WDL WDL

## 2022-07-10 NOTE — ED PROVIDER NOTES
"  History   Chief Complaint:  Otalgia    The history is provided by a caregiver.  used: caregiver.      Abdoulaye Peguero is a 82 year old female with history of GERD, chronic headache, hyperlipidemia, osteoarthritis of spine with radiculopathy, and frequent falls who presents with otalgia. Patients caregiver reports onset of left ear pain that started about one week ago. This pain radiates up to the left eye, down to the jaw, and neck. States that the patient has been taking Tylenol frequently without alleviation of pain. Of note, patient was seen for similar pain about 6 months ago and her ear was very clogged with wax. States that it was too painful to remove and it was suggested that the patient go under anesthesia to remove the wax. Denies using ear drops.     Review of Systems   HENT: Positive for ear pain.    Eyes: Positive for pain.   Musculoskeletal: Positive for myalgias.   All other systems reviewed and are negative.    Allergies:  No Known Allergies    Medications:  Atorvastatin  Pantoprazole    Past Medical History:     Chronic headache  GERD  Hyperlipidemia  Prediabetes  Urinary incontinence  Vitamin D deficiency  Body aches  DDD, lumbar  Osteoarthritis of spine with radiculopathy, thoracolumbar region  Depressed mood  Frequent falls  Urinary frequency     Social History:  Presents with caregiver  Presents via private vehicle    Physical Exam     Patient Vitals for the past 24 hrs:   BP Temp Temp src Pulse Resp SpO2 Height Weight   07/10/22 1250 -- -- -- -- -- -- 1.6 m (5' 3\") 63.5 kg (140 lb)   07/10/22 1249 (!) 164/100 97.9  F (36.6  C) Temporal 81 16 95 % -- --     Physical Exam  HENT:      Head:      Jaw: No trismus.      Left Ear: Drainage and tenderness present. No mastoid tenderness.      Mouth/Throat:      Pharynx: No uvula swelling.      Tonsils: No tonsillar abscesses.   Neck:      Trachea: Phonation normal.   Lymphadenopathy:      Cervical: No cervical adenopathy. "   Neurological:      Mental Status: She is alert.   Psychiatric:         Behavior: Behavior is cooperative.         Emergency Department Course   Emergency Department Course:     Reviewed:  I reviewed nursing notes, vitals, past medical history and Care Everywhere    Assessments:  1401 I obtained history and examined the patient as noted above.     Disposition:  The patient was discharged to home.     Impression & Plan   Medical Decision Making:    Abdoulaye Peguero is a 82 year old female brought to the emergency department with severe ear pain.  No clinical evidence of mastoiditis.  I was unable to visualize the TM but the tenderness and drainage of the canal is consistent with otitis externa.  The canal does not appear as swollen as an otitis externa maligna.  We will treat aggressively with antibiotics by mouth as well as an antibiotic steroid combination eardrop.    Diagnosis:    ICD-10-CM    1. Otitis externa of left ear, unspecified chronicity, unspecified type  H60.92        Discharge Medications:  New Prescriptions    AMOXICILLIN-CLAVULANATE (AUGMENTIN) 875-125 MG TABLET    Take 1 tablet by mouth 2 times daily for 10 days    CIPROFLOXACIN-DEXAMETHASONE (CIPRODEX) 0.3-0.1 % OTIC SUSPENSION    Place 4 drops Into the left ear 2 times daily for 10 days    HYDROCODONE-ACETAMINOPHEN (NORCO) 5-325 MG TABLET    Take 1 tablet by mouth every 6 hours as needed for severe pain       Scribe Disclosure:  I, Theodora Ayala, am serving as a scribe at 2:01 PM on 7/10/2022 to document services personally performed by Bertha Camacho MD based on my observations and the provider's statements to me.        Bertha Camacho MD  07/10/22 8767

## 2022-07-12 ENCOUNTER — PATIENT OUTREACH (OUTPATIENT)
Dept: CARE COORDINATION | Facility: CLINIC | Age: 82
End: 2022-07-12

## 2022-07-12 NOTE — PROGRESS NOTES
Clinic Care Coordination Contact  [facility]/Voicemail     Clinical Data:  CC Outreach  Outreach attempted on 07/12/22; total outreach attempts x 1:    CC left message on daughter/caregiver Holly's voicemail with call back information and requested return call.  Status: Patient is on  CC panel, status as potential.   Plan:  CC to continue outreach attempts.        MELIDA Marcial (Abbey)  , Care Coordination  [facility]  [facility]  [facility]  [email or web address]   Office: [phone]

## 2022-08-01 ENCOUNTER — PATIENT OUTREACH (OUTPATIENT)
Dept: CARE COORDINATION | Facility: CLINIC | Age: 82
End: 2022-08-01

## 2022-08-01 NOTE — PROGRESS NOTES
Clinic Care Coordination Contact  [facility]/Voicemail     Clinical Data:  CC Outreach  Outreach attempted on 08/01/22; total outreach attempts x 2:    CC left message on [employer] with call back information and requested return call.  Status: Patient is on  CC panel, status as potential.   Plan: MARLO  to continue outreach attempts.        MELIDA Marcial (Abbey)  , Care Coordination  [facility]  [facility]  [facility]  [email or web address]   Office: [phone]

## 2022-08-08 ENCOUNTER — TELEPHONE (OUTPATIENT)
Dept: OBGYN | Facility: CLINIC | Age: 82
End: 2022-08-08

## 2022-08-08 NOTE — TELEPHONE ENCOUNTER
----- Message from Dian Philip RN sent at 8/4/2022  1:18 PM CDT -----  Regarding: Francisco is calling to make sure form was filled out and sent

## 2022-08-08 NOTE — TELEPHONE ENCOUNTER
Attempted to reach Holly via telephone in regards to a voicemail. Patient did not answer, voicemail left encouraging patient to call back to discuss further.

## 2022-08-08 NOTE — TELEPHONE ENCOUNTER
Holly called again and stated that they are waiting for the diet form to be completed for the [facility]. It was faxed over and just needs to be completed and faxed back to the number that was given in the paperwork that was received. If you need to reach Holly, her number is [phone].

## 2022-08-10 ENCOUNTER — TELEPHONE (OUTPATIENT)
Dept: OBGYN | Facility: CLINIC | Age: 82
End: 2022-08-10

## 2022-08-18 ENCOUNTER — PATIENT OUTREACH (OUTPATIENT)
Dept: CARE COORDINATION | Facility: CLINIC | Age: 82
End: 2022-08-18

## 2022-08-18 NOTE — PROGRESS NOTES
Clinic Care Coordination - Chart Review Only    Situation/Background: Patient chart reviewed by care coordinator related to [employer] conversion.    Assessment: Patient unable to be reached after x2 attempts.    Plan: No further outreaches will be made at this time unless a new referral is made or a change in the patient's status occurs.  Patient was provided with [facility] contact information and encouraged to call with any questions or concerns.      MELIDA Marcial ([place])  , Care Coordination  [facility]  [facility]  [facility]  [email or web address]   Office: [phone]

## 2022-09-18 ENCOUNTER — HEALTH MAINTENANCE LETTER (OUTPATIENT)
Age: 82
End: 2022-09-18

## 2022-09-28 ENCOUNTER — OFFICE VISIT (OUTPATIENT)
Dept: FAMILY MEDICINE | Facility: CLINIC | Age: 82
End: 2022-09-28
Attending: FAMILY MEDICINE
Payer: MEDICARE

## 2022-09-28 ENCOUNTER — MEDICAL CORRESPONDENCE (OUTPATIENT)
Dept: HEALTH INFORMATION MANAGEMENT | Facility: CLINIC | Age: 82
End: 2022-09-28

## 2022-09-28 VITALS
BODY MASS INDEX: 23.57 KG/M2 | SYSTOLIC BLOOD PRESSURE: 154 MMHG | WEIGHT: 133 LBS | HEIGHT: 63 IN | HEART RATE: 71 BPM | DIASTOLIC BLOOD PRESSURE: 90 MMHG

## 2022-09-28 DIAGNOSIS — L29.9 ITCHY SCALP: Primary | ICD-10-CM

## 2022-09-28 DIAGNOSIS — R06.09 DYSPNEA ON EXERTION: ICD-10-CM

## 2022-09-28 DIAGNOSIS — M79.622 PAIN OF LEFT UPPER ARM: ICD-10-CM

## 2022-09-28 PROCEDURE — 99215 OFFICE O/P EST HI 40 MIN: CPT | Performed by: FAMILY MEDICINE

## 2022-09-28 PROCEDURE — G0463 HOSPITAL OUTPT CLINIC VISIT: HCPCS

## 2022-09-28 RX ORDER — HYDROXYZINE HYDROCHLORIDE 10 MG/1
10 TABLET, FILM COATED ORAL 3 TIMES DAILY PRN
Qty: 30 TABLET | Refills: 1 | Status: SHIPPED | OUTPATIENT
Start: 2022-09-28

## 2022-09-28 NOTE — PATIENT INSTRUCTIONS
Hydroxyzine 10 mg 1 tablet up to do 3x/day for itching scalp  At pharmacy: Hydrocortisone scalp relief liquid up to 2x/day as needed  Schedule echocardiogram   Schedule appointment with orthopedics/sports medicine for left arm   Schedule follow up appointment with Dr. Hernandez in 1-3 months

## 2022-09-28 NOTE — PROGRESS NOTES
Assessment & Plan   Problem List Items Addressed This Visit    None  Visit Diagnoses     Itchy scalp    -  Primary    Relevant Medications    hydrOXYzine (ATARAX) 10 MG tablet    Pain of left upper arm        Relevant Orders    Orthopedic  Referral    Dyspnea on exertion        Relevant Orders    Echocardiogram Complete             43 minutes spent on the date of the encounter doing chart review, patient visit, documentation and care coordination            No follow-ups on file.    Sai Mcallister MD  [facility] [place]    Fernanda Stanford is a 82 year old accompanied by her --audio, presenting for the following health issues:  Follow Up (Follow up for pain on her head even to touch and also itching)      HPI   Pt. Here for follow-up after 6/29/2022 visit, comes alone  Audio     1. Painful, Itching scalp, x  5-6 months, getting worse  No rash, but scalp itching all over, and very painful to touch  Tried sal acyclic  acid shampoo from last time, nothing working  Nothing makes better or worse    2. L arm hurts, can't lift anything  X  8 years  Worse recently in area of biceps/deltoid.  Hand and forearm working; urts to  things,     3. Dyspnea with exertion,  Worried about heart, Echocardiogram from 6/29/2022 visit did not get done    Patient had a hard time understanding the last   did not realize wanted family  Member present today or echocardiolgram    Current Outpatient Medications   Medication     acetaminophen (TYLENOL) 650 MG CR tablet     atorvastatin (LIPITOR) 20 MG tablet     Cholecalciferol (VITAMIN D) 50 MCG (2000 UT) CAPS     erythromycin (ROMYCIN) 5 MG/GM ophthalmic ointment     estradiol (ESTRACE) 0.1 MG/GM vaginal cream     hydrOXYzine (ATARAX) 10 MG tablet     neomycin-polymyxin-hydrocortisone (CORTISPORIN) 3.5-18561-1 otic suspension     pantoprazole (PROTONIX) 40 MG EC tablet     POLYMYXIN B-TRIMETHOPRIM OP     PREMARIN  "0.625 MG/GM vaginal cream     salicylic acid (P&S) 2 % external shampoo     No current facility-administered medications for this visit.       Review of Systems   Per HPI      Objective    BP (!) 154/90 (BP Location: Right arm, Patient Position: Sitting, Cuff Size: Adult Regular)   Pulse 71   Ht 1.6 m (5' 3\")   Wt 60.3 kg (133 lb)   BMI 23.56 kg/m    Body mass index is 23.56 kg/m .  Physical Exam   Alert, NAD  Konstantin treated hair  Scalp--no erythema, no rash/flakes, no alopecia, pt notes pain everywhere scalp is touched, hair not loose  No cervical adeonpathy  L arm: nontender at joint, tender to palpation at biceps tendon origin, abduction and flexion limited due to pain      A/P  1. Pruritic, painful scalp  Etiology unclear  Trial of hydroxyzine 10 mg tid prn for itching scalp, OTC hydorcortisone liquid to scalp bid prn, hold konstantin.  Can consider very low dose gabapentin (100-300 mg at bedtime) if pain persists ?neuralgia.    2. L arm pain   Likely tendonopathy, refer to orthopedics  3. Dyspnea with exertions   schedule Echocardiogram     Discussed with patient that I will be leaving [facility], recommend to follow-up in 1-3 months with another provider, recommend Dr. Hernandez  Ideally to  come with 1 family member to help with visit.         "

## 2022-09-28 NOTE — NURSING NOTE
Chief Complaint   Patient presents with     Follow Up     Follow up for pain on her head even to touch and also itching

## 2022-11-29 ENCOUNTER — HOSPITAL ENCOUNTER (OUTPATIENT)
Dept: GENERAL RADIOLOGY | Facility: CLINIC | Age: 82
Discharge: HOME | End: 2022-11-29
Attending: FAMILY MEDICINE
Payer: MEDICARE

## 2022-11-29 ENCOUNTER — OFFICE VISIT (OUTPATIENT)
Dept: FAMILY MEDICINE | Facility: CLINIC | Age: 82
End: 2022-11-29
Attending: FAMILY MEDICINE
Payer: MEDICARE

## 2022-11-29 VITALS
DIASTOLIC BLOOD PRESSURE: 83 MMHG | HEIGHT: 63 IN | SYSTOLIC BLOOD PRESSURE: 144 MMHG | HEART RATE: 72 BPM | BODY MASS INDEX: 23.78 KG/M2 | WEIGHT: 134.2 LBS

## 2022-11-29 DIAGNOSIS — R06.09 DYSPNEA ON EXERTION: ICD-10-CM

## 2022-11-29 DIAGNOSIS — G89.29 CHRONIC PAIN IN LEFT SHOULDER: ICD-10-CM

## 2022-11-29 DIAGNOSIS — Z76.89 ENCOUNTER TO ESTABLISH CARE: Primary | ICD-10-CM

## 2022-11-29 DIAGNOSIS — M25.512 CHRONIC PAIN IN LEFT SHOULDER: ICD-10-CM

## 2022-11-29 PROCEDURE — 90662 IIV NO PRSV INCREASED AG IM: CPT

## 2022-11-29 PROCEDURE — 250N000011 HC RX IP 250 OP 636

## 2022-11-29 PROCEDURE — 73030 X-RAY EXAM OF SHOULDER: CPT | Mod: LT

## 2022-11-29 PROCEDURE — G0008 ADMIN INFLUENZA VIRUS VAC: HCPCS

## 2022-11-29 PROCEDURE — 99214 OFFICE O/P EST MOD 30 MIN: CPT | Performed by: FAMILY MEDICINE

## 2022-11-29 PROCEDURE — G0463 HOSPITAL OUTPT CLINIC VISIT: HCPCS

## 2022-11-29 RX ORDER — OFLOXACIN 3 MG/ML
SOLUTION/ DROPS OPHTHALMIC
COMMUNITY
Start: 2022-08-16 | End: 2022-11-29

## 2022-11-29 NOTE — PROGRESS NOTES
CC: Establish Care    Due to language barrier, a phone  was utilized during the history-taking and subsequent discussion  with this patient.    ASSESSMENT/PLAN:   Abdoulaye was seen today for establish care.    Encounter to establish care  Pt given flu vaccine today.     Chronic pain in left shoulder  Start with shoulder X-ray. Consider dedicated shoulder PT vs sports med referral, will call w/ X-ray results.   -     XR Shoulder Left 2 Views; Future    Dyspnea on exertion  Will assist with echocardiogram scheduling and call her with a date and time once this is scheduled.   -     Echocardiogram Complete; Future      Worrisome signs and symptoms were discussed with Abdoulaye and she was instructed to return to the clinic for concerning symptoms or to call with questions.  I asked her to bring all of her meds w/ her to her next visit to review, she wasn't sure exactly which medications she takes.     Gisela Hernandez MD  Family Medicine      SUBJECTIVE: Abdoulaye is a 82 year old female with HLD who comes in to establish care. Previous PCP Dr. Mcallister.  She is here alone.     Hyperlipidemia. Long-standing diagnosis.   - Medications: Has been treating with atorvastatin 20 mg daily. No adverse medication effects. Denies myalgias.   - Most recent surveillance labs reviewed today and are notable for  (2/4/2022).     Dyspnea on exertion. For about a year. Has discussed with prior PCP Dr. Mcallister on 2 visits this past yr. Echo was ordered and she was called to schedule echocardiogram but didn't  b/c she didn't know who was calling. Hoping to get assistance on how to schedule.  Says her SOB is worse with activity and with eating.   Denies CP. Denies PND. Denies LE edema. No cardiac history.   She was prescribed PPI awhile back but it has been a long time since she's taken it.     Left shoulder pain. Years. About 8-10. Reduced range of motion. Has done exercises at home without improvement. Has done PT for neck  "and back pain w/o improvement. No injury or trauma. Pain in lateral shoulder, over upper back, and biceps.       OBJECTIVE:   BP (!) 144/83   Pulse 72   Ht 1.6 m (5' 3\")   Wt 60.9 kg (134 lb 3.2 oz)   BMI 23.77 kg/m    General: Alert and oriented in no acute distress.  Cardio: RRR, normal S1 and S2, without murmurs, rubs, or gallops appreciated.    Resp: CTA bilaterally. Normal respiratory effort.   Ext: Radial pulses 2+ and symmetric, lower extremities without deformity, edema.  Shoulder: Left shoulder with reduced ROM in abduction and forward flexion, strength exam limited d/t pain, TTP over AC joint. Exam is difficult using phone , delayed responses between exam maneuvers and pt responses.    Time note (e4, 30'): The total time (on the date of service) for this service was 33 minutes, including discussion/face-to-face, chart review, interpretation not otherwise reported, documentation, and updating of the computerized record.    "

## 2022-11-30 ENCOUNTER — TELEPHONE (OUTPATIENT)
Dept: OBGYN | Facility: CLINIC | Age: 82
End: 2022-11-30

## 2022-11-30 DIAGNOSIS — I51.7 LVH (LEFT VENTRICULAR HYPERTROPHY): ICD-10-CM

## 2022-11-30 DIAGNOSIS — R06.09 DYSPNEA ON EXERTION: Primary | ICD-10-CM

## 2022-11-30 DIAGNOSIS — M75.80 ROTATOR CUFF TENDINITIS: Primary | ICD-10-CM

## 2022-11-30 NOTE — TELEPHONE ENCOUNTER
Called Abdoulaye with assist of [facility] .  She requests that I contact her caregiver, Holly, with the information.    Called Holly and advised of echo appointment.  She will call orthopedics to schedule.

## 2022-11-30 NOTE — RESULT ENCOUNTER NOTE
Please let Abdoulaye know her shoulder x-ray is consistent with rotator cuff tendinitis and mild arthritis. This is causing her left shoulder pain. Please place a referral to orthopedics and she can schedule with them for further management.     Also, we had discussed yesterday at her visit that we would help schedule her echocardiogram which was ordered. Please assist with scheduling this due to language barrier.     Thank you so much.

## 2022-11-30 NOTE — PROGRESS NOTES
Referral to orthopedics placed for rotator cuff tendinitis and arthritis.     Called patient with help of .  Patient needs to call cardiology at [phone] to schedule echo, then call ortho at [phone] to schedule appointment for rotator cuff tendinitis.      Left VM to call clinic back.

## 2022-12-12 ENCOUNTER — ANCILLARY PROCEDURE (OUTPATIENT)
Dept: CARDIOLOGY | Facility: CLINIC | Age: 82
End: 2022-12-12
Attending: FAMILY MEDICINE
Payer: MEDICARE

## 2022-12-12 DIAGNOSIS — R06.09 DYSPNEA ON EXERTION: ICD-10-CM

## 2022-12-12 LAB — LVEF ECHO: NORMAL

## 2022-12-12 PROCEDURE — 93306 TTE W/DOPPLER COMPLETE: CPT | Performed by: STUDENT IN AN ORGANIZED HEALTH CARE EDUCATION/TRAINING PROGRAM

## 2023-02-03 ENCOUNTER — OFFICE VISIT (OUTPATIENT)
Dept: CARDIOLOGY | Facility: CLINIC | Age: 83
End: 2023-02-03
Attending: FAMILY MEDICINE
Payer: MEDICARE

## 2023-02-03 VITALS
DIASTOLIC BLOOD PRESSURE: 89 MMHG | BODY MASS INDEX: 23.75 KG/M2 | SYSTOLIC BLOOD PRESSURE: 162 MMHG | HEART RATE: 67 BPM | OXYGEN SATURATION: 98 % | WEIGHT: 134.1 LBS

## 2023-02-03 DIAGNOSIS — Z00.00 PREVENTATIVE HEALTH CARE: Primary | ICD-10-CM

## 2023-02-03 DIAGNOSIS — I51.7 LVH (LEFT VENTRICULAR HYPERTROPHY): ICD-10-CM

## 2023-02-03 DIAGNOSIS — R06.09 DYSPNEA ON EXERTION: ICD-10-CM

## 2023-02-03 DIAGNOSIS — I50.32 CHRONIC HEART FAILURE WITH PRESERVED EJECTION FRACTION (HFPEF) (H): ICD-10-CM

## 2023-02-03 LAB
ATRIAL RATE - MUSE: 63 BPM
DIASTOLIC BLOOD PRESSURE - MUSE: NORMAL MMHG
INTERPRETATION ECG - MUSE: NORMAL
P AXIS - MUSE: 63 DEGREES
PR INTERVAL - MUSE: 180 MS
QRS DURATION - MUSE: 82 MS
QT - MUSE: 400 MS
QTC - MUSE: 409 MS
R AXIS - MUSE: 7 DEGREES
SYSTOLIC BLOOD PRESSURE - MUSE: NORMAL MMHG
T AXIS - MUSE: 47 DEGREES
VENTRICULAR RATE- MUSE: 63 BPM

## 2023-02-03 PROCEDURE — 99205 OFFICE O/P NEW HI 60 MIN: CPT | Performed by: STUDENT IN AN ORGANIZED HEALTH CARE EDUCATION/TRAINING PROGRAM

## 2023-02-03 PROCEDURE — G0463 HOSPITAL OUTPT CLINIC VISIT: HCPCS | Mod: 25 | Performed by: STUDENT IN AN ORGANIZED HEALTH CARE EDUCATION/TRAINING PROGRAM

## 2023-02-03 PROCEDURE — 93005 ELECTROCARDIOGRAM TRACING: CPT

## 2023-02-03 PROCEDURE — G0463 HOSPITAL OUTPT CLINIC VISIT: HCPCS | Mod: 25

## 2023-02-03 RX ORDER — DAPAGLIFLOZIN 10 MG/1
10 TABLET, FILM COATED ORAL DAILY
Qty: 90 TABLET | Refills: 3 | Status: SHIPPED | OUTPATIENT
Start: 2023-02-03

## 2023-02-03 ASSESSMENT — PAIN SCALES - GENERAL: PAINLEVEL: NO PAIN (0)

## 2023-02-03 NOTE — NURSING NOTE
February 3, 2023    Medication Changes: Start Farxiga 10 mg daily      Follow Up:   -Fasting labs when able  -Follow up with Dr. Umanzor in 6 months    Patient verbalized understanding of all health information given and agreed to call with further questions or concerns.      Yareli Kwong RN

## 2023-02-03 NOTE — LETTER
2/3/2023      RE: Abdoulaye Peguero  [address]  [facility] [address]       Dear Colleague,    Thank you for the opportunity to participate in the care of your patient, Abdoulaye Peguero, at the [facility] [place] [facility]. Please see a copy of my visit note below.    Chief complaint: Shortness of breath    HPI:   Abdoulaye Peguero is a 82 year old female with history of hyperlipidemia who presents for evaluation of shortness of breath.      Patient reports shortness of breath after eating and when she walks, reports symptoms for about one year, mild to moderate intensity. Resolves with rest.     She does notice more related to when he is hungry or after eating. No relationship with specific types of food.     PAST MEDICAL HISTORY:  No past medical history on file.    CURRENT MEDICATIONS:  Current Outpatient Medications   Medication Sig Dispense Refill     acetaminophen (TYLENOL) 650 MG CR tablet Take 1 tablet (650 mg) by mouth every 8 hours as needed for mild pain or fever 90 tablet 11     atorvastatin (LIPITOR) 20 MG tablet Take 1 tablet (20 mg) by mouth daily 90 tablet 3     Cholecalciferol (VITAMIN D) 50 MCG (2000 UT) CAPS Take 1 capsule by mouth daily Take one capsule daily. 90 capsule 1     erythromycin (ROMYCIN) 5 MG/GM ophthalmic ointment SMARTSIG:In Eye(s)       estradiol (ESTRACE) 0.1 MG/GM vaginal cream Place 1 g vaginally       hydrOXYzine (ATARAX) 10 MG tablet Take 1 tablet (10 mg) by mouth 3 times daily as needed for itching 30 tablet 1     salicylic acid (P&S) 2 % external shampoo Shampoo with small amount 1-2x/week 236 mL 3       PAST SURGICAL HISTORY:  No past surgical history on file.    ALLERGIES:   No Known Allergies    FAMILY HISTORY:  Family History   Family history unknown: Yes     SOCIAL HISTORY:  Social History     Tobacco Use     Smoking status: Never     Smokeless tobacco: Never   Substance Use Topics     Alcohol use:  Never     Drug use: Never       ROS:   A comprehensive 14 point review of systems is negative other than as mentioned in HPI.    Exam:  BP (!) 162/89 (BP Location: Right arm, Patient Position: Chair, Cuff Size: Adult Large)   Pulse 67   Wt 60.8 kg (134 lb 1.6 oz)   SpO2 98%   BMI 23.75 kg/m    GENERAL APPEARANCE: healthy, alert and no distress  EYES: no icterus, no xanthelasmas  NECK: JVP not elevated  RESPIRATORY: lungs clear to auscultation - no rales, rhonchi or wheezes, no use of accessory muscles, no retractions, respirations are unlabored, normal respiratory rate  CARDIOVASCULAR: regular rhythm, normal S1 with physiologic split S2, no S3 or S4 and no murmur, click or rub.  GI: soft, non tender, bowel sounds normal,no abdominal bruits  EXTREMITIES: no edema, no bruits  NEURO: alert and oriented to person/place/time, normal speech, gait and affect  PSYCH: cooperative, affect appropriate.     Labs:  Reviewed.   LDL Cholesterol Calculated   Date Value Ref Range Status   02/04/2022 122 (H) <=100 mg/dL Final   08/26/2020 133 (H) <100 mg/dL Final     Comment:     Above desirable:  100-129 mg/dl  Borderline High:  130-159 mg/dL  High:             160-189 mg/dL  Very high:       >189 mg/dl         Testing/Procedures:  Echo 12/2022  Global and regional left ventricular function is normal with an EF of 55-60%.  Mild concentric wall thickening consistent with left ventricular hypertrophy  is present.  The right ventricle is normal size. Global right ventricular function is  normal.  IVC diameter <2.1 cm collapsing >50% with sniff suggests a normal RA pressure  of 3 mmHg.  No pericardial effusion is present.    Assessment and Plan:   Abdoulaye Peguero is a 82 year old female with history of hyperlipidemia who presents for evaluation of shortness of breath    Mild LVH likely due to hypertension over time.     HTN blood pressure above goal, was normal at home per patient.     HLD   - Continue atorvastatin 20 mg   - Repeat  lipid panel     Shortness of breath over the past year, echocardiogram showed normal cardiac function, will try dapaglifozin. She denies chest pain. Likely has a component of HFpEF given LVH and age  - Dapaglifozin 10 mg   - Pro BNP    Chart review time: 30 minutes  Visit time: 30 minutes  Total time spent: 60 minutes  >50% of this 30-minute visit were spent with the patient on in-person counseling and discussion regarding shortness of breath.     The patient states understanding and is agreeable with plan.     Ángel Umanzor MD  Cardiology    CC  [facility]

## 2023-02-03 NOTE — PATIENT INSTRUCTIONS
February 3, 2023    Cardiology Provider You Saw During Your Visit: Dr. Umanzor      Medication Changes: Start Farxiga 10 mg daily      Follow Up:   -Fasting labs when able  -Follow up with Dr. Umanzor in 6 months      Follow the American Heart Association Diet and Lifestyle Recommendations:  -Limit saturated fat, trans fat, sodium, red meat, sweets and sugar-sweetened beverages. If you choose to eat red meat, compare labels and select the leanest cuts available.  -Aim for at least 150 minutes of moderate physical activity or 75 minutes of vigorous physical activity - or an equal combination of both - each week.      To Reach Us:  -During business hours: [phone], press option # 1 to schedule an appointment or to leave a message for your care team.     -After hours, weekends or holidays: [phone], press option #4 and ask to speak to the on-call cardiologist. Inform them you are a patient at the [facility].      Yareli Kwong RN  Cardiology Care Coordinator - General Cardiology  [facility]

## 2023-02-03 NOTE — NURSING NOTE
Chief Complaint   Patient presents with     New Patient     New [place] pt, referred d/t REEVES + LVH     Vitals were taken and medications reconciled.    Sang Garcia, EMT  2:52 PM

## 2023-02-03 NOTE — PROGRESS NOTES
Chief complaint: Shortness of breath    HPI:   Abdoulaye Peguero is a 82 year old female with history of hyperlipidemia who presents for evaluation of shortness of breath.      Patient reports shortness of breath after eating and when she walks, reports symptoms for about one year, mild to moderate intensity. Resolves with rest.     She does notice more related to when he is hungry or after eating. No relationship with specific types of food.     PAST MEDICAL HISTORY:  No past medical history on file.    CURRENT MEDICATIONS:  Current Outpatient Medications   Medication Sig Dispense Refill     acetaminophen (TYLENOL) 650 MG CR tablet Take 1 tablet (650 mg) by mouth every 8 hours as needed for mild pain or fever 90 tablet 11     atorvastatin (LIPITOR) 20 MG tablet Take 1 tablet (20 mg) by mouth daily 90 tablet 3     Cholecalciferol (VITAMIN D) 50 MCG (2000 UT) CAPS Take 1 capsule by mouth daily Take one capsule daily. 90 capsule 1     erythromycin (ROMYCIN) 5 MG/GM ophthalmic ointment SMARTSIG:In Eye(s)       estradiol (ESTRACE) 0.1 MG/GM vaginal cream Place 1 g vaginally       hydrOXYzine (ATARAX) 10 MG tablet Take 1 tablet (10 mg) by mouth 3 times daily as needed for itching 30 tablet 1     salicylic acid (P&S) 2 % external shampoo Shampoo with small amount 1-2x/week 236 mL 3       PAST SURGICAL HISTORY:  No past surgical history on file.    ALLERGIES:   No Known Allergies    FAMILY HISTORY:  Family History   Family history unknown: Yes     SOCIAL HISTORY:  Social History     Tobacco Use     Smoking status: Never     Smokeless tobacco: Never   Substance Use Topics     Alcohol use: Never     Drug use: Never       ROS:   A comprehensive 14 point review of systems is negative other than as mentioned in HPI.    Exam:  BP (!) 162/89 (BP Location: Right arm, Patient Position: Chair, Cuff Size: Adult Large)   Pulse 67   Wt 60.8 kg (134 lb 1.6 oz)   SpO2 98%   BMI 23.75 kg/m    GENERAL APPEARANCE: healthy, alert and no  distress  EYES: no icterus, no xanthelasmas  NECK: JVP not elevated  RESPIRATORY: lungs clear to auscultation - no rales, rhonchi or wheezes, no use of accessory muscles, no retractions, respirations are unlabored, normal respiratory rate  CARDIOVASCULAR: regular rhythm, normal S1 with physiologic split S2, no S3 or S4 and no murmur, click or rub.  GI: soft, non tender, bowel sounds normal,no abdominal bruits  EXTREMITIES: no edema, no bruits  NEURO: alert and oriented to person/place/time, normal speech, gait and affect  PSYCH: cooperative, affect appropriate.     Labs:  Reviewed.   LDL Cholesterol Calculated   Date Value Ref Range Status   02/04/2022 122 (H) <=100 mg/dL Final   08/26/2020 133 (H) <100 mg/dL Final     Comment:     Above desirable:  100-129 mg/dl  Borderline High:  130-159 mg/dL  High:             160-189 mg/dL  Very high:       >189 mg/dl         Testing/Procedures:  Echo 12/2022  Global and regional left ventricular function is normal with an EF of 55-60%.  Mild concentric wall thickening consistent with left ventricular hypertrophy  is present.  The right ventricle is normal size. Global right ventricular function is  normal.  IVC diameter <2.1 cm collapsing >50% with sniff suggests a normal RA pressure  of 3 mmHg.  No pericardial effusion is present.    Assessment and Plan:   Abdoulaye Peguero is a 82 year old female with history of hyperlipidemia who presents for evaluation of shortness of breath    Mild LVH likely due to hypertension over time.     HTN blood pressure above goal, was normal at home per patient.     HLD   - Continue atorvastatin 20 mg   - Repeat lipid panel     Shortness of breath over the past year, echocardiogram showed normal cardiac function, will try dapaglifozin. She denies chest pain. Likely has a component of HFpEF given LVH and age  - Dapaglifozin 10 mg   - Pro BNP    Chart review time: 30 minutes  Visit time: 30 minutes  Total time spent: 60 minutes  >50% of this 30-minute  visit were spent with the patient on in-person counseling and discussion regarding shortness of breath.     The patient states understanding and is agreeable with plan.     Ángel Umanzor MD  Cardiology    CC  [facility]

## 2023-02-07 ENCOUNTER — LAB (OUTPATIENT)
Dept: LAB | Facility: CLINIC | Age: 83
End: 2023-02-07
Payer: MEDICARE

## 2023-02-07 DIAGNOSIS — Z00.00 PREVENTATIVE HEALTH CARE: ICD-10-CM

## 2023-02-07 DIAGNOSIS — R06.09 DYSPNEA ON EXERTION: ICD-10-CM

## 2023-02-07 LAB
ANION GAP SERPL CALCULATED.3IONS-SCNC: 9 MMOL/L (ref 7–15)
BUN SERPL-MCNC: 16 MG/DL (ref 8–23)
CALCIUM SERPL-MCNC: 9.1 MG/DL (ref 8.8–10.2)
CHLORIDE SERPL-SCNC: 106 MMOL/L (ref 98–107)
CHOLEST SERPL-MCNC: 228 MG/DL
CREAT SERPL-MCNC: 0.62 MG/DL (ref 0.51–0.95)
DEPRECATED HCO3 PLAS-SCNC: 26 MMOL/L (ref 22–29)
GFR SERPL CREATININE-BSD FRML MDRD: 87 ML/MIN/1.73M2
GLUCOSE SERPL-MCNC: 98 MG/DL (ref 70–99)
HDLC SERPL-MCNC: 65 MG/DL
LDLC SERPL CALC-MCNC: 149 MG/DL
NONHDLC SERPL-MCNC: 163 MG/DL
NT-PROBNP SERPL-MCNC: 83 PG/ML (ref 0–1800)
POTASSIUM SERPL-SCNC: 4.2 MMOL/L (ref 3.4–5.3)
SODIUM SERPL-SCNC: 141 MMOL/L (ref 136–145)
TRIGL SERPL-MCNC: 68 MG/DL

## 2023-02-07 PROCEDURE — 80048 BASIC METABOLIC PNL TOTAL CA: CPT | Performed by: PATHOLOGY

## 2023-02-07 PROCEDURE — 83880 ASSAY OF NATRIURETIC PEPTIDE: CPT | Performed by: PATHOLOGY

## 2023-02-07 PROCEDURE — 36415 COLL VENOUS BLD VENIPUNCTURE: CPT | Performed by: PATHOLOGY

## 2023-02-07 PROCEDURE — 80061 LIPID PANEL: CPT | Performed by: PATHOLOGY

## 2023-03-02 DIAGNOSIS — E78.00 HYPERCHOLESTEROLEMIA: ICD-10-CM

## 2023-03-02 RX ORDER — ATORVASTATIN CALCIUM 20 MG/1
20 TABLET, FILM COATED ORAL DAILY
Qty: 90 TABLET | Refills: 3 | Status: SHIPPED | OUTPATIENT
Start: 2023-03-02 | End: 2023-08-25

## 2023-06-04 ENCOUNTER — HEALTH MAINTENANCE LETTER (OUTPATIENT)
Age: 83
End: 2023-06-04

## 2023-08-09 ENCOUNTER — TELEPHONE (OUTPATIENT)
Dept: FAMILY MEDICINE | Facility: CLINIC | Age: 83
End: 2023-08-09

## 2023-08-09 ENCOUNTER — TELEPHONE (OUTPATIENT)
Dept: FAMILY MEDICINE | Facility: CLINIC | Age: 83
End: 2023-08-09
Payer: MEDICARE

## 2023-08-09 NOTE — TELEPHONE ENCOUNTER
Received Special Diet Instruction Request form via fax on 08/09/2023. Left in provider's bin to be completed and faxed over.

## 2023-08-10 ENCOUNTER — TELEPHONE (OUTPATIENT)
Dept: FAMILY MEDICINE | Facility: CLINIC | Age: 83
End: 2023-08-10
Payer: MEDICARE

## 2023-08-10 NOTE — TELEPHONE ENCOUNTER
[facility] Call Center    Phone Message    May a detailed message be left on voicemail: yes     Reason for Call: Per Holly pt auth rep calling regarding recent Special Diet Instruction Request form that was sent to clinic. Writer informed Holly that forms have been placed in [employer] bin per chart note. Holly states fax number is on form and to send forms asap Please call Holly if needed    Action Taken: Message routed to:  Other: [facility]    Travel Screening: Not Applicable

## 2023-08-15 NOTE — TELEPHONE ENCOUNTER
Health Call Center    Phone Message    May a detailed message be left on voicemail: yes     Reason for Call: Other: Holly who is patient's Authorized Representative called stating that she spoke with [facility] and they state they haven't received below fax. She wants to know if this can be sent to her email instead of refaxing. Her email is [email or web address] States if email is not an option please contact her for other options. Please contact Holly at [phone] in regards to this message. Thank you     Action Taken: Other: S    Travel Screening: Not Applicable

## 2023-08-23 ENCOUNTER — APPOINTMENT (OUTPATIENT)
Dept: GENERAL RADIOLOGY | Facility: CLINIC | Age: 83
End: 2023-08-23
Attending: STUDENT IN AN ORGANIZED HEALTH CARE EDUCATION/TRAINING PROGRAM
Payer: MEDICARE

## 2023-08-23 ENCOUNTER — HOSPITAL ENCOUNTER (EMERGENCY)
Facility: CLINIC | Age: 83
Discharge: HOME | End: 2023-08-23
Attending: STUDENT IN AN ORGANIZED HEALTH CARE EDUCATION/TRAINING PROGRAM | Admitting: STUDENT IN AN ORGANIZED HEALTH CARE EDUCATION/TRAINING PROGRAM
Payer: MEDICARE

## 2023-08-23 VITALS
TEMPERATURE: 97.9 F | OXYGEN SATURATION: 98 % | DIASTOLIC BLOOD PRESSURE: 85 MMHG | RESPIRATION RATE: 18 BRPM | HEART RATE: 63 BPM | SYSTOLIC BLOOD PRESSURE: 142 MMHG

## 2023-08-23 DIAGNOSIS — I50.32 CHRONIC HEART FAILURE WITH PRESERVED EJECTION FRACTION (HFPEF) (H): ICD-10-CM

## 2023-08-23 DIAGNOSIS — I51.7 LVH (LEFT VENTRICULAR HYPERTROPHY): ICD-10-CM

## 2023-08-23 DIAGNOSIS — R06.09 DYSPNEA ON EXERTION: ICD-10-CM

## 2023-08-23 DIAGNOSIS — M79.662 PAIN OF LEFT LOWER LEG: ICD-10-CM

## 2023-08-23 LAB
ANION GAP SERPL CALCULATED.3IONS-SCNC: 7 MMOL/L (ref 7–15)
BUN SERPL-MCNC: 14.8 MG/DL (ref 8–23)
CALCIUM SERPL-MCNC: 9.2 MG/DL (ref 8.8–10.2)
CHLORIDE SERPL-SCNC: 104 MMOL/L (ref 98–107)
CREAT SERPL-MCNC: 0.55 MG/DL (ref 0.51–0.95)
D DIMER PPP FEU-MCNC: 0.33 UG/ML FEU (ref 0–0.5)
DEPRECATED HCO3 PLAS-SCNC: 26 MMOL/L (ref 22–29)
GFR SERPL CREATININE-BSD FRML MDRD: 89 ML/MIN/1.73M2
GLUCOSE SERPL-MCNC: 103 MG/DL (ref 70–99)
HOLD SPECIMEN: NORMAL
HOLD SPECIMEN: NORMAL
POTASSIUM SERPL-SCNC: 3.9 MMOL/L (ref 3.4–5.3)
SODIUM SERPL-SCNC: 137 MMOL/L (ref 136–145)

## 2023-08-23 PROCEDURE — 99284 EMERGENCY DEPT VISIT MOD MDM: CPT

## 2023-08-23 PROCEDURE — 80061 LIPID PANEL: CPT

## 2023-08-23 PROCEDURE — 250N000013 HC RX MED GY IP 250 OP 250 PS 637: Performed by: STUDENT IN AN ORGANIZED HEALTH CARE EDUCATION/TRAINING PROGRAM

## 2023-08-23 PROCEDURE — 85379 FIBRIN DEGRADATION QUANT: CPT | Performed by: STUDENT IN AN ORGANIZED HEALTH CARE EDUCATION/TRAINING PROGRAM

## 2023-08-23 PROCEDURE — 36415 COLL VENOUS BLD VENIPUNCTURE: CPT | Performed by: STUDENT IN AN ORGANIZED HEALTH CARE EDUCATION/TRAINING PROGRAM

## 2023-08-23 PROCEDURE — 73562 X-RAY EXAM OF KNEE 3: CPT | Mod: LT

## 2023-08-23 PROCEDURE — 250N000013 HC RX MED GY IP 250 OP 250 PS 637: Performed by: EMERGENCY MEDICINE

## 2023-08-23 PROCEDURE — 82310 ASSAY OF CALCIUM: CPT | Performed by: STUDENT IN AN ORGANIZED HEALTH CARE EDUCATION/TRAINING PROGRAM

## 2023-08-23 RX ORDER — IBUPROFEN 200 MG
400 TABLET ORAL ONCE
Status: COMPLETED | OUTPATIENT
Start: 2023-08-23 | End: 2023-08-23

## 2023-08-23 RX ORDER — ACETAMINOPHEN 325 MG/1
975 TABLET ORAL ONCE
Status: COMPLETED | OUTPATIENT
Start: 2023-08-23 | End: 2023-08-23

## 2023-08-23 RX ORDER — ACETAMINOPHEN 500 MG
500-1000 TABLET ORAL EVERY 6 HOURS PRN
Qty: 30 TABLET | Refills: 0 | Status: SHIPPED | OUTPATIENT
Start: 2023-08-23

## 2023-08-23 RX ADMIN — IBUPROFEN 400 MG: 200 TABLET, FILM COATED ORAL at 14:46

## 2023-08-23 RX ADMIN — ACETAMINOPHEN 975 MG: 325 TABLET, FILM COATED ORAL at 12:12

## 2023-08-23 ASSESSMENT — ACTIVITIES OF DAILY LIVING (ADL): ADLS_ACUITY_SCORE: 35

## 2023-08-23 NOTE — DISCHARGE INSTRUCTIONS
Return to the emergency department if symptoms are worsening, become concerning, or for any other concerns. Follow-up with your doctor in 2-3 and sooner if needed.

## 2023-08-23 NOTE — ED TRIAGE NOTES
ABCs intact. Pt c/o L leg pain, swelling. Pt took ibuprofen last night, which helped with the pain.

## 2023-08-23 NOTE — ED PROVIDER NOTES
History     Chief Complaint:  Leg Swelling     The history is provided by the patient. The history is limited by a language barrier. A  was used (Relative).      Abdoulaye Peguero is a 83 year old female with history of osteoarthritis, hyperlipidemia, who presents to the ED with a relative for evaluation of left leg swelling that started last night. She took 2 Advil which helped, but then the swelling came back today along with pain. Patient had a similar issue last year in her right leg. Denies fever. Denies history of blood clots or cancer. Denies recent fall or surgery.  No trauma.    Independent Historian:   Family member assists with translation.    Review of External Notes:   February 2, 2023, cardiology note.    Medications:    acetaminophen (TYLENOL) 500 MG tablet  atorvastatin (LIPITOR) 20 MG tablet  Cholecalciferol (VITAMIN D) 50 MCG (2000 UT) CAPS  dapagliflozin (FARXIGA) 10 MG TABS tablet  erythromycin (ROMYCIN) 5 MG/GM ophthalmic ointment  estradiol (ESTRACE) 0.1 MG/GM vaginal cream  hydrOXYzine (ATARAX) 10 MG tablet  salicylic acid (P&S) 2 % external shampoo        Past Medical History:    No past medical history on file.    Past Surgical History:    No past surgical history on file.     Physical Exam   Patient Vitals for the past 24 hrs:   BP Temp Temp src Pulse Resp SpO2   08/23/23 1628 (!) 142/85 -- -- 63 -- 98 %   08/23/23 1208 126/74 97.9  F (36.6  C) Temporal 87 18 95 %      Physical Exam  GENERAL: Patient well-appearing  HEAD: Atraumatic.  NECK: No rigidity  DERM: No rash. Skin warm and dry  EXTREMITY: Some pain with moving left knee. Not hot or bright red. Left calf is soft, no palpable cords.  Leg compartments soft.  Full range of motion of the left knee but some pain with full extension.  No pain with micro agitation of the left knee joint.  No pain with loading of the left knee joint. Leg appears unremarkable.  Neuro: Strength 5 out of 5 left lower extremity.  Sensation  intact to light touch left lower extremity.  VASCULAR:  Pulses 2+ in left foot.    Emergency Department Course     Imaging:  XR Knee Left 3 Views   Final Result   IMPRESSION:    Probable knee joint effusion. Joint spaces are maintained. No   evidence of acute fracture.      SRINIVAS LOPEZ MD            SYSTEM ID:  [id number]         Report per radiology    Laboratory:  Labs Ordered and Resulted from Time of ED Arrival to Time of ED Departure   BASIC METABOLIC PANEL - Abnormal       Result Value    Sodium 137      Potassium 3.9      Chloride 104      Carbon Dioxide (CO2) 26      Anion Gap 7      Urea Nitrogen 14.8      Creatinine 0.55      Calcium 9.2      Glucose 103 (*)     GFR Estimate 89     D DIMER QUANTITATIVE - Normal    D-Dimer Quantitative 0.33            Emergency Department Course & Assessments:     Interventions:  Medications   acetaminophen (TYLENOL) tablet 975 mg (975 mg Oral $Given 8/23/23 1212)   ibuprofen (ADVIL/MOTRIN) tablet 400 mg (400 mg Oral $Given 8/23/23 1446)      Independent Interpretation (X-rays, CTs, rhythm strip):  I independently reviewed the left knee XR and see no acute bony abnormalities.     Assessments/Consultations/Discussion of Management or Tests:  ED Course as of 08/23/23 1705   Wed Aug 23, 2023   1435 I obtained history and examined the patient as noted above.    1617 I rechecked and updated the patient.      Social Determinants of Health affecting care:   None    Disposition:  The patient was discharged to home.     Impression & Plan        Medical Decision Making:  Symptoms most consistent with musculoskeletal pain of left knee and left leg.    VS and exam reassuring.    Chronic conditions complicating -history of osteoarthritis.    DDx considered by not limited to fracture, infection, compartment syndrome, however evaluation not consistent with these etiologies.    X-ray left knee no bony abnormality.  Small effusion.  Ordered D-dimer as patient is low risk by Wells to  restratify for DVT.  D-dimer within normal limits, therefore ultrasound considered but not indicated as not suggestive of DVT.    Given ibuprofen and Tylenol and patient with improved symptoms on repeat assessment.    Do not see signs of joint infection.  Neurovascular intact.    Prescription medication -Tylenol.    Patient was evaluated for acute medical emergencies. Based on my clinical assessment, I do not think any further acute management or work-up is required.  Patient stable for discharge. Given strict return precautions. All questions answered. Patient content with plan. Recommended PCP follow-up in 2-3 days.      Critical Care time:  was 0 minutes for this patient excluding procedures.    Diagnosis:    ICD-10-CM    1. Pain of left lower leg  M79.662            Discharge Medications:  Discharge Medication List as of 8/23/2023  4:22 PM        START taking these medications    Details   acetaminophen (TYLENOL) 500 MG tablet Take 1-2 tablets (500-1,000 mg) by mouth every 6 hours as needed for mild pain, Disp-30 tablet, R-0, E-Prescribe            Scribe Disclosure:  TASIA BAZZI, am serving as a scribe at 3:58 PM on 8/23/2023 to document services personally performed by Dylan Cain MD based on my observations and the provider's statements to me.     8/23/2023   Dylan Cain MD Foss, Kevin, MD  08/23/23 5568

## 2023-08-25 ENCOUNTER — OFFICE VISIT (OUTPATIENT)
Dept: CARDIOLOGY | Facility: CLINIC | Age: 83
End: 2023-08-25
Attending: STUDENT IN AN ORGANIZED HEALTH CARE EDUCATION/TRAINING PROGRAM
Payer: MEDICARE

## 2023-08-25 VITALS
WEIGHT: 129.9 LBS | OXYGEN SATURATION: 100 % | BODY MASS INDEX: 23.01 KG/M2 | HEART RATE: 80 BPM | SYSTOLIC BLOOD PRESSURE: 148 MMHG | DIASTOLIC BLOOD PRESSURE: 87 MMHG

## 2023-08-25 DIAGNOSIS — I50.32 CHRONIC HEART FAILURE WITH PRESERVED EJECTION FRACTION (HFPEF) (H): ICD-10-CM

## 2023-08-25 DIAGNOSIS — R06.09 DYSPNEA ON EXERTION: ICD-10-CM

## 2023-08-25 DIAGNOSIS — I51.7 LVH (LEFT VENTRICULAR HYPERTROPHY): Primary | ICD-10-CM

## 2023-08-25 LAB
CHOLEST SERPL-MCNC: 207 MG/DL
HDLC SERPL-MCNC: 54 MG/DL
LDLC SERPL CALC-MCNC: 131 MG/DL
NONHDLC SERPL-MCNC: 153 MG/DL
TRIGL SERPL-MCNC: 108 MG/DL

## 2023-08-25 PROCEDURE — 99215 OFFICE O/P EST HI 40 MIN: CPT | Performed by: STUDENT IN AN ORGANIZED HEALTH CARE EDUCATION/TRAINING PROGRAM

## 2023-08-25 PROCEDURE — G0463 HOSPITAL OUTPT CLINIC VISIT: HCPCS | Performed by: STUDENT IN AN ORGANIZED HEALTH CARE EDUCATION/TRAINING PROGRAM

## 2023-08-25 RX ORDER — LOSARTAN POTASSIUM 25 MG/1
25 TABLET ORAL DAILY
Qty: 90 TABLET | Refills: 1 | Status: SHIPPED | OUTPATIENT
Start: 2023-08-25 | End: 2024-09-16

## 2023-08-25 RX ORDER — PRAVASTATIN SODIUM 20 MG
20 TABLET ORAL DAILY
Qty: 90 TABLET | Refills: 1 | Status: SHIPPED | OUTPATIENT
Start: 2023-08-25 | End: 2023-10-13 | Stop reason: SINTOL

## 2023-08-25 ASSESSMENT — PAIN SCALES - GENERAL: PAINLEVEL: NO PAIN (0)

## 2023-08-25 NOTE — PROGRESS NOTES
Chief complaint: Shortness of breath    HPI:   Abdoulaye Peguero is a 83 year old female with history of hyperlipidemia who presents for evaluation of shortness of breath.      Patient reports shortness of breath after eating and when she walks, reports symptoms for about one year, mild to moderate intensity. Resolves with rest.     She does notice more related to when he is hungry or after eating. No relationship with specific types of food.     PAST MEDICAL HISTORY:  No past medical history on file.    CURRENT MEDICATIONS:  Current Outpatient Medications   Medication Sig Dispense Refill    acetaminophen (TYLENOL) 500 MG tablet Take 1-2 tablets (500-1,000 mg) by mouth every 6 hours as needed for mild pain 30 tablet 0    atorvastatin (LIPITOR) 20 MG tablet Take 1 tablet (20 mg) by mouth daily 90 tablet 3    Cholecalciferol (VITAMIN D) 50 MCG (2000 UT) CAPS Take 1 capsule by mouth daily Take one capsule daily. 90 capsule 1    dapagliflozin (FARXIGA) 10 MG TABS tablet Take 1 tablet (10 mg) by mouth daily 90 tablet 3    erythromycin (ROMYCIN) 5 MG/GM ophthalmic ointment SMARTSIG:In Eye(s)      estradiol (ESTRACE) 0.1 MG/GM vaginal cream Place 1 g vaginally      hydrOXYzine (ATARAX) 10 MG tablet Take 1 tablet (10 mg) by mouth 3 times daily as needed for itching 30 tablet 1    salicylic acid (P&S) 2 % external shampoo Shampoo with small amount 1-2x/week 236 mL 3       PAST SURGICAL HISTORY:  No past surgical history on file.    ALLERGIES:   No Known Allergies    FAMILY HISTORY:  Family History   Family history unknown: Yes     SOCIAL HISTORY:  Social History     Tobacco Use    Smoking status: Never    Smokeless tobacco: Never   Substance Use Topics    Alcohol use: Never    Drug use: Never       ROS:   A comprehensive 14 point review of systems is negative other than as mentioned in HPI.    Exam:  BP (!) 148/87 (BP Location: Right arm, Patient Position: Chair, Cuff Size: Adult Regular)   Pulse 80   Wt 58.9 kg (129 lb 14.4  oz)   SpO2 100%   BMI 23.01 kg/m    GENERAL APPEARANCE: healthy, alert and no distress  EYES: no icterus, no xanthelasmas  NECK: JVP not elevated  RESPIRATORY: lungs clear to auscultation - no rales, rhonchi or wheezes, no use of accessory muscles, no retractions, respirations are unlabored, normal respiratory rate  CARDIOVASCULAR: regular rhythm, normal S1 with physiologic split S2, no S3 or S4 and no murmur, click or rub.  GI: soft, non tender, bowel sounds normal,no abdominal bruits  EXTREMITIES: no edema, no bruits    Labs:  Reviewed.   LDL Cholesterol Calculated   Date Value Ref Range Status   02/07/2023 149 (H) <=100 mg/dL Final   08/26/2020 133 (H) <100 mg/dL Final     Comment:     Above desirable:  100-129 mg/dl  Borderline High:  130-159 mg/dL  High:             160-189 mg/dL  Very high:       >189 mg/dl         Testing/Procedures:  Echo 12/2022  Global and regional left ventricular function is normal with an EF of 55-60%.  Mild concentric wall thickening consistent with left ventricular hypertrophy  is present.  The right ventricle is normal size. Global right ventricular function is  normal.  IVC diameter <2.1 cm collapsing >50% with sniff suggests a normal RA pressure  of 3 mmHg.  No pericardial effusion is present.    Assessment and Plan:     Mild LVH likely due to hypertension over time.     HTN blood pressure above goal, was normal at home per patient.   - Start low dose Losartan   - Med reconciliation pharmacy     HLD - not at goal   - Stop Atorvastatin and start pravastatin 20 mg   - Repeat lipid panel     Shortness of breath over the past year, echocardiogram showed normal cardiac function, will try dapaglifozin. She denies chest pain. Likely has a component of HFpEF given LVH and age. Her pro BNP was within normal levels.   - Dapaglifozin 10 mg     Chart review time: 20 minutes  Visit time: 20 minutes  Total time spent: 40 minutes  >50% of this 20-minute visit were spent with the patient on  in-person counseling and discussion regarding LVH.     The patient states understanding and is agreeable with plan.     Ángel Umanzor MD  Cardiology    CC  [facility]

## 2023-08-25 NOTE — PATIENT INSTRUCTIONS
August 25, 2023    Cardiology Provider You Saw During Your Visit: Dr. Umanzor      Medication Changes:   -Stop atorvastatin  -Start pravastatin 20 mg daily  -Start losartan 25 mg daily      Follow Up:   -Echocardiogram when able  -Labs in 2 weeks  -Referral to Med Therapy Management  -Follow up with Dr. Umanzor in 3-6 months        Follow the American Heart Association Diet and Lifestyle Recommendations:  -Limit saturated fat, trans fat, sodium, red meat, sweets and sugar-sweetened beverages. If you choose to eat red meat, compare labels and select the leanest cuts available.  -Aim for at least 150 minutes of moderate physical activity or 75 minutes of vigorous physical activity - or an equal combination of both - each week.      To Reach Us:  -During business hours: [phone], press option # 1 to schedule an appointment or to leave a message for your care team.     -After hours, weekends or holidays: [phone], press option #4 and ask to speak to the on-call cardiologist. Inform them you are a patient at the [facility].        **If you have a cardiac device, please make sure you schedule an in-person device check just prior to your cardiology provider appointments**        Yareli Kwong RN  Cardiology Care Coordinator - General Cardiology  [facility]

## 2023-08-25 NOTE — NURSING NOTE
August 25, 2023    Medication Changes:   -Stop atorvastatin  -Start pravastatin 20 mg daily  -Start losartan 25 mg daily      Follow Up:   -Echocardiogram when able  -Labs in 2 weeks  -Referral to Med Therapy Management  -Follow up with Dr. Umanzor in 3-6 months      Patient verbalized understanding of all health information given and agreed to call with further questions or concerns.      Yareli Kwong RN

## 2023-08-25 NOTE — LETTER
8/25/2023      RE: Abdoulaye Peguero  [address]  [facility] [address]       Dear Colleague,    Thank you for the opportunity to participate in the care of your patient, Abdoulaye Peguero, at the [facility] [place] at [facility]. Please see a copy of my visit note below.    Chief complaint: Shortness of breath    HPI:   Abdoulaye Peguero is a 83 year old female with history of hyperlipidemia who presents for evaluation of shortness of breath.      Patient reports shortness of breath after eating and when she walks, reports symptoms for about one year, mild to moderate intensity. Resolves with rest.     She does notice more related to when he is hungry or after eating. No relationship with specific types of food.     PAST MEDICAL HISTORY:  No past medical history on file.    CURRENT MEDICATIONS:  Current Outpatient Medications   Medication Sig Dispense Refill    acetaminophen (TYLENOL) 500 MG tablet Take 1-2 tablets (500-1,000 mg) by mouth every 6 hours as needed for mild pain 30 tablet 0    atorvastatin (LIPITOR) 20 MG tablet Take 1 tablet (20 mg) by mouth daily 90 tablet 3    Cholecalciferol (VITAMIN D) 50 MCG (2000 UT) CAPS Take 1 capsule by mouth daily Take one capsule daily. 90 capsule 1    dapagliflozin (FARXIGA) 10 MG TABS tablet Take 1 tablet (10 mg) by mouth daily 90 tablet 3    erythromycin (ROMYCIN) 5 MG/GM ophthalmic ointment SMARTSIG:In Eye(s)      estradiol (ESTRACE) 0.1 MG/GM vaginal cream Place 1 g vaginally      hydrOXYzine (ATARAX) 10 MG tablet Take 1 tablet (10 mg) by mouth 3 times daily as needed for itching 30 tablet 1    salicylic acid (P&S) 2 % external shampoo Shampoo with small amount 1-2x/week 236 mL 3       PAST SURGICAL HISTORY:  No past surgical history on file.    ALLERGIES:   No Known Allergies    FAMILY HISTORY:  Family History   Family history unknown: Yes     SOCIAL HISTORY:  Social History     Tobacco Use    Smoking  status: Never    Smokeless tobacco: Never   Substance Use Topics    Alcohol use: Never    Drug use: Never       ROS:   A comprehensive 14 point review of systems is negative other than as mentioned in HPI.    Exam:  BP (!) 148/87 (BP Location: Right arm, Patient Position: Chair, Cuff Size: Adult Regular)   Pulse 80   Wt 58.9 kg (129 lb 14.4 oz)   SpO2 100%   BMI 23.01 kg/m    GENERAL APPEARANCE: healthy, alert and no distress  EYES: no icterus, no xanthelasmas  NECK: JVP not elevated  RESPIRATORY: lungs clear to auscultation - no rales, rhonchi or wheezes, no use of accessory muscles, no retractions, respirations are unlabored, normal respiratory rate  CARDIOVASCULAR: regular rhythm, normal S1 with physiologic split S2, no S3 or S4 and no murmur, click or rub.  GI: soft, non tender, bowel sounds normal,no abdominal bruits  EXTREMITIES: no edema, no bruits    Labs:  Reviewed.   LDL Cholesterol Calculated   Date Value Ref Range Status   02/07/2023 149 (H) <=100 mg/dL Final   08/26/2020 133 (H) <100 mg/dL Final     Comment:     Above desirable:  100-129 mg/dl  Borderline High:  130-159 mg/dL  High:             160-189 mg/dL  Very high:       >189 mg/dl         Testing/Procedures:  Echo 12/2022  Global and regional left ventricular function is normal with an EF of 55-60%.  Mild concentric wall thickening consistent with left ventricular hypertrophy  is present.  The right ventricle is normal size. Global right ventricular function is  normal.  IVC diameter <2.1 cm collapsing >50% with sniff suggests a normal RA pressure  of 3 mmHg.  No pericardial effusion is present.    Assessment and Plan:     Mild LVH likely due to hypertension over time.     HTN blood pressure above goal, was normal at home per patient.   - Start low dose Losartan   - Med reconciliation pharmacy     HLD - not at goal   - Stop Atorvastatin and start pravastatin 20 mg   - Repeat lipid panel     Shortness of breath over the past year,  echocardiogram showed normal cardiac function, will try dapaglifozin. She denies chest pain. Likely has a component of HFpEF given LVH and age. Her pro BNP was within normal levels.   - Dapaglifozin 10 mg     Chart review time: 20 minutes  Visit time: 20 minutes  Total time spent: 40 minutes  >50% of this 20-minute visit were spent with the patient on in-person counseling and discussion regarding LVH.     The patient states understanding and is agreeable with plan.           Please do not hesitate to contact me if you have any questions/concerns.     Sincerely,     Erna Abebe MD

## 2023-08-28 ENCOUNTER — APPOINTMENT (OUTPATIENT)
Dept: INTERPRETER SERVICES | Facility: CLINIC | Age: 83
End: 2023-08-28
Payer: MEDICARE

## 2023-08-29 ENCOUNTER — OFFICE VISIT (OUTPATIENT)
Dept: FAMILY MEDICINE | Facility: CLINIC | Age: 83
End: 2023-08-29
Attending: FAMILY MEDICINE
Payer: MEDICARE

## 2023-08-29 VITALS
BODY MASS INDEX: 22.93 KG/M2 | SYSTOLIC BLOOD PRESSURE: 116 MMHG | HEART RATE: 77 BPM | DIASTOLIC BLOOD PRESSURE: 76 MMHG | HEIGHT: 63 IN | WEIGHT: 129.4 LBS

## 2023-08-29 DIAGNOSIS — G89.29 CHRONIC PAIN OF LEFT KNEE: ICD-10-CM

## 2023-08-29 DIAGNOSIS — E55.9 VITAMIN D DEFICIENCY: ICD-10-CM

## 2023-08-29 DIAGNOSIS — H61.23 BILATERAL IMPACTED CERUMEN: ICD-10-CM

## 2023-08-29 DIAGNOSIS — M25.562 CHRONIC PAIN OF LEFT KNEE: ICD-10-CM

## 2023-08-29 DIAGNOSIS — I51.7 LVH (LEFT VENTRICULAR HYPERTROPHY): ICD-10-CM

## 2023-08-29 DIAGNOSIS — Z00.00 ENCOUNTER FOR MEDICARE ANNUAL WELLNESS EXAM: Primary | ICD-10-CM

## 2023-08-29 DIAGNOSIS — Z01.00 EXAMINATION OF EYES AND VISION: ICD-10-CM

## 2023-08-29 PROBLEM — R73.03 PREDIABETES: Status: RESOLVED | Noted: 2018-02-27 | Resolved: 2023-08-29

## 2023-08-29 PROBLEM — R52 BODY ACHES: Status: RESOLVED | Noted: 2018-02-19 | Resolved: 2023-08-29

## 2023-08-29 PROCEDURE — 99214 OFFICE O/P EST MOD 30 MIN: CPT | Performed by: FAMILY MEDICINE

## 2023-08-29 PROCEDURE — G0463 HOSPITAL OUTPT CLINIC VISIT: HCPCS | Performed by: FAMILY MEDICINE

## 2023-08-29 RX ORDER — ACETAMINOPHEN 500 MG
500-1000 TABLET ORAL EVERY 6 HOURS PRN
Qty: 90 TABLET | Refills: 0 | Status: SHIPPED | OUTPATIENT
Start: 2023-08-29

## 2023-08-29 NOTE — PROGRESS NOTES
CC: Annual Visit    Due to language barrier, an  was utilized during the history-taking and subsequent discussion with this patient.    SUBJECTIVE:  Abdoulaye is a 83 year old female here for a physical exam.     She met with cardiology on 8/25/2023 for dyspnea on exertion and medication changes were made:   -Stop atorvastatin  -Start pravastatin 20 mg daily  -Start losartan 25 mg daily  -Start Dapaglifozin 10 mg daily      She has left leg pain -- went to ED on 8/23, dx with MSK leg pain - left knee X-ray no bony abnormality, D dimer normal. She was given ibuprofen and Tylenol which does help.     Her current problems were reviewed. The above nursing notes were also reviewed. The past medical history, allergies, surgical history and social history were reviewed with her and updated as necessary.      Patient Active Problem List   Diagnosis    Chronic headache    Gastroesophageal reflux disease    Hyperlipidemia    Urinary incontinence    Vitamin D deficiency    DDD (degenerative disc disease), lumbar    Osteoarthritis of spine with radiculopathy, thoracolumbar region    Depressed mood    LVH (left ventricular hypertrophy)       Prevention History:  - Advance directive - Not on file.  - Hearing concerns - Does feel like she has left ear hearing loss. Thinks she may have some earwax in that ear.   - Fall Risk - No falls in the last year.   - Independent at home - Lives alone but has assistance. Goes to adult .   - Safety - Safe at home.   - Memory concerns - No concerns. Minicog deferred due to needing an .   - Vaccines -  Due for teatnus, shinges, and Covid vaccines - recommend obtaining at pharmacy.   - Cervical cancer screening - n/a.  - Breast Screening - 2/2/2023 - BIRADS1.   - Colorectal cancer screening - Deferred due to age.   - Osteoporosis screening - Declines.  - STI testing - No concerns.  - Hypertension - Patient is treated and normotensive.  - DM screening - 8/23/2023 - glucose  "103.   - Hyperlipidemia screening - on statin.    - Nutrition/Exercise - Body mass index is 22.92 kg/m .   - Alcohol misuse - No concerns.  - Tobacco use - none. Lung cancer screening - n/a.       OBJECTIVE:  /76   Pulse 77   Ht 1.6 m (5' 3\")   Wt 58.7 kg (129 lb 6.4 oz)   BMI 22.92 kg/m     GENERAL:  Healthy, alert; in no distress  SKIN: Normal skin color, texture, and turgor; no rashes or lesions  HEENT:  Normocephalic without masses, lesions, tenderness, or abnormalities;  bilateral TMs obstructed by cerumen, Conjunctiva and sclera clear, PERRL.Oropharyngeal mucosa is moist without lesions or exudates; teeth are in good repair  NECK: Supple and free of adenopathy or masses, no thyroid enlargement or nodules.  CHEST: Clear to auscultation. Normal work of breathing.   CARDIAC: Regular rate and rhythm, normal S1 and S2,  no murmurs, rubs, clicks, or gallops  ABDOMEN: Soft, nontender; active bowel sounds; no masses or hepatosplenomegaly  EXTREMITIES:  No deformities, edema, skin discoloration, or lesions  NEUROLOGICAL:  CN II - XII intact  PSYCH: Mood and affect appropriate.       ASSESSMENT/PLAN:   Abdoulaye was seen today for annual visit.  Encounter for Medicare annual wellness exam  Declines vaccines and DEXA today.     LVH (left ventricular hypertrophy)  Continue follow-up with cardiology. Check CBC and ALT.  -     ALT; Future  -     CBC with Platelets; Future    Vitamin D deficiency  Vitamin D 2000 international unit(s) daily. Check vitamin D level with next set of labs.   -     25 Hydroxyvitamin D2 and D3; Future    Chronic pain of left knee  Tylenol has been helpful. Will send Rx to pharmacy.   -     acetaminophen (TYLENOL) 500 MG tablet; Take 1-2 tablets (500-1,000 mg) by mouth every 6 hours as needed for mild pain    Bilateral impacted cerumen  Declines in-clinic ear cleaning as it has been painful for her in the past. Would like to try Debrox ear drops at home, recommend twice weekly. Consider ENT " referral if not improving.   -     carbamide peroxide (DEBROX) 6.5 % otic solution; Place 5 drops into both ears twice a week    Examination of eyes and vision  Referral for eye exam, possible cataracts.   -     Adult Eye  Referral; Future        Return in about 53 weeks (around 9/3/2024) for Annual Wellness Visit.    Gisela Hernandez MD  Family Medicine

## 2023-08-29 NOTE — PATIENT INSTRUCTIONS
Patient Education   Personalized Prevention Plan  You are due for the preventive services outlined below.  Your care team is available to assist you in scheduling these services.  If you have already completed any of these items, please share that information with your care team to update in your medical record.  Health Maintenance Due   Topic Date Due    Heart Failure Action Plan  Never done    URINE DRUG SCREEN  Never done    Discuss Advance Care Planning  Never done    Diptheria Tetanus Pertussis (DTAP/TDAP/TD) Vaccine (1 - Tdap) Never done    Zoster (Shingles) Vaccine (1 of 2) Never done    Annual Wellness Visit  Never done    FALL RISK ASSESSMENT  Never done    Liver Monitoring Lab  03/05/2022    Complete Blood Count  03/05/2022    COVID-19 Vaccine (3 - Booster for Emeterio series) 03/28/2022    PHQ-2 (once per calendar year)  01/01/2023    Flu Vaccine (1) 09/01/2023      Thank you for trusting us with your care!     If you need to contact us for questions about:  Symptoms, Scheduling & Medical Questions; Non-urgent (2-3 day response) QikServe message, Urgent (needing response today) [phone] (if after 3:30pm next day response)   Prescriptions: Please call your Pharmacy   Billing: Kike [phone] or  Physicians:[phone]

## 2023-09-15 ENCOUNTER — ANCILLARY PROCEDURE (OUTPATIENT)
Dept: CARDIOLOGY | Facility: CLINIC | Age: 83
End: 2023-09-15
Attending: STUDENT IN AN ORGANIZED HEALTH CARE EDUCATION/TRAINING PROGRAM
Payer: MEDICARE

## 2023-09-15 ENCOUNTER — LAB (OUTPATIENT)
Dept: LAB | Facility: CLINIC | Age: 83
End: 2023-09-15
Payer: MEDICARE

## 2023-09-15 DIAGNOSIS — E55.9 VITAMIN D DEFICIENCY: ICD-10-CM

## 2023-09-15 DIAGNOSIS — I50.32 CHRONIC HEART FAILURE WITH PRESERVED EJECTION FRACTION (HFPEF) (H): ICD-10-CM

## 2023-09-15 DIAGNOSIS — I51.7 LVH (LEFT VENTRICULAR HYPERTROPHY): ICD-10-CM

## 2023-09-15 DIAGNOSIS — R06.09 DYSPNEA ON EXERTION: ICD-10-CM

## 2023-09-15 LAB
ALT SERPL W P-5'-P-CCNC: 7 U/L (ref 0–50)
ANION GAP SERPL CALCULATED.3IONS-SCNC: 10 MMOL/L (ref 7–15)
BUN SERPL-MCNC: 15.7 MG/DL (ref 8–23)
CALCIUM SERPL-MCNC: 9.4 MG/DL (ref 8.8–10.2)
CHLORIDE SERPL-SCNC: 106 MMOL/L (ref 98–107)
CREAT SERPL-MCNC: 0.59 MG/DL (ref 0.51–0.95)
DEPRECATED HCO3 PLAS-SCNC: 23 MMOL/L (ref 22–29)
EGFRCR SERPLBLD CKD-EPI 2021: 88 ML/MIN/1.73M2
ERYTHROCYTE [DISTWIDTH] IN BLOOD BY AUTOMATED COUNT: 11.9 % (ref 10–15)
GLUCOSE SERPL-MCNC: 100 MG/DL (ref 70–99)
HCT VFR BLD AUTO: 35.5 % (ref 35–47)
HGB BLD-MCNC: 11.8 G/DL (ref 11.7–15.7)
LVEF ECHO: NORMAL
MCH RBC QN AUTO: 31.3 PG (ref 26.5–33)
MCHC RBC AUTO-ENTMCNC: 33.2 G/DL (ref 31.5–36.5)
MCV RBC AUTO: 94 FL (ref 78–100)
PLATELET # BLD AUTO: 230 10E3/UL (ref 150–450)
POTASSIUM SERPL-SCNC: 4.4 MMOL/L (ref 3.4–5.3)
RBC # BLD AUTO: 3.77 10E6/UL (ref 3.8–5.2)
SODIUM SERPL-SCNC: 139 MMOL/L (ref 136–145)
WBC # BLD AUTO: 6.5 10E3/UL (ref 4–11)

## 2023-09-15 PROCEDURE — 99000 SPECIMEN HANDLING OFFICE-LAB: CPT | Performed by: PATHOLOGY

## 2023-09-15 PROCEDURE — 93306 TTE W/DOPPLER COMPLETE: CPT | Performed by: INTERNAL MEDICINE

## 2023-09-15 PROCEDURE — 82306 VITAMIN D 25 HYDROXY: CPT | Performed by: FAMILY MEDICINE

## 2023-09-15 PROCEDURE — 36415 COLL VENOUS BLD VENIPUNCTURE: CPT | Performed by: PATHOLOGY

## 2023-09-15 PROCEDURE — 80048 BASIC METABOLIC PNL TOTAL CA: CPT | Performed by: PATHOLOGY

## 2023-09-15 PROCEDURE — 84460 ALANINE AMINO (ALT) (SGPT): CPT | Performed by: PATHOLOGY

## 2023-09-15 PROCEDURE — 85027 COMPLETE CBC AUTOMATED: CPT | Performed by: PATHOLOGY

## 2023-09-20 LAB
DEPRECATED CALCIDIOL+CALCIFEROL SERPL-MC: <35 UG/L (ref 20–75)
VITAMIN D2 SERPL-MCNC: <5 UG/L
VITAMIN D3 SERPL-MCNC: 30 UG/L

## 2023-10-13 ENCOUNTER — VIRTUAL VISIT (OUTPATIENT)
Dept: FAMILY MEDICINE | Facility: CLINIC | Age: 83
End: 2023-10-13
Attending: FAMILY MEDICINE
Payer: MEDICARE

## 2023-10-13 DIAGNOSIS — M25.561 BILATERAL CHRONIC KNEE PAIN: ICD-10-CM

## 2023-10-13 DIAGNOSIS — E78.00 HYPERCHOLESTEROLEMIA: ICD-10-CM

## 2023-10-13 DIAGNOSIS — M47.25 OSTEOARTHRITIS OF SPINE WITH RADICULOPATHY, THORACOLUMBAR REGION: Primary | ICD-10-CM

## 2023-10-13 DIAGNOSIS — M25.562 BILATERAL CHRONIC KNEE PAIN: ICD-10-CM

## 2023-10-13 DIAGNOSIS — G89.29 BILATERAL CHRONIC KNEE PAIN: ICD-10-CM

## 2023-10-13 PROCEDURE — 99441 PR PHYSICIAN TELEPHONE EVALUATION 5-10 MIN: CPT | Mod: 95 | Performed by: FAMILY MEDICINE

## 2023-10-13 RX ORDER — ATORVASTATIN CALCIUM 40 MG/1
40 TABLET, FILM COATED ORAL DAILY
Qty: 90 TABLET | Refills: 0 | Status: SHIPPED | OUTPATIENT
Start: 2023-10-13 | End: 2024-09-16

## 2023-10-13 RX ORDER — NAPROXEN 500 MG/1
500 TABLET ORAL 2 TIMES DAILY WITH MEALS
Qty: 28 TABLET | Refills: 0 | Status: SHIPPED | OUTPATIENT
Start: 2023-10-13 | End: 2023-10-27

## 2023-10-13 NOTE — PROGRESS NOTES
Abdoulaye is a 83 year old who is being evaluated via a billable telephone visit.      Distant Location (provider location):  On-site    Called  and unable to obtain Oromo , so Holly (patient's authorized representative) acted as .     Assessment & Plan     Osteoarthritis of spine with radiculopathy, thoracolumbar region  Bilateral chronic knee pain  Chronic back and knee pain, with recent flare. Recent BMP reviewed and has normal kidney function. Can try 2 weeks of naproxen for pain relief. Referral placed to [facility] in [place] for further eval & mgmnt. Not able to be assessed today d/t phone visit.   - Orthopedic  Referral; Future  - naproxen (NAPROSYN) 500 MG tablet; Take 1 tablet (500 mg) by mouth 2 times daily (with meals) for 14 days    Hypercholesterolemia  Will switch back to atorvastatin but at higher dose of 40 mg daily (from 20 mg daily). I discussed with the patient the risks, benefits, and alternatives to taking this medication as well as common side effects. Has cardiology follow-up in 1/2024 and can recehck labs at that time.   - atorvastatin (LIPITOR) 40 MG tablet; Take 1 tablet (40 mg) by mouth daily        Gisela Hernandez MD  [facility]    Subjective   Abdoulaye is a 83 year old, presenting for the following health issues:  Back Pain    HPI   A few days of back pain that is getting worse over time. Last night, she was completely unable to move due to pain. From her lower back all the way to her bilateral knees. She also has bilateral shoulder pain. She can't lift her arms due to pain. Holly gave her some Tylenol 1000 mg which helped a little bit. Heating pad helped a little too. No injury or trauma. She's had similar symptoms in the past. She's had imaging without answers.     Chart review shows:   MR lumbar spine 5/7/2019:  Multilevel degenerative spine disease as described above.  Most significantly, there is  mild to moderate spinal canal narrowing  at L3-4 and L4-5 with abutment of the descending bilateral L4 and L5  nerve roots in the lateral recesses at these levels.     Cervical spine XR 6/11/2019:   Multilevel cervical spondylosis, most pronounced at C5-6  with moderate disc space narrowing.    She did have a Steroid shot in her knee did help for quite some time. Went through [facility]. Wondering about seeing ortho again for flares of back and knee pain.     Also wants to bring up -- was on atorvastatin 20 mg daily but switched to pravastatin since LDL was not at goal, and now having muscle aches.          Objective       Vitals:  No vitals were obtained today due to virtual visit.    Physical Exam   RESP: No cough, no audible wheezing, able to talk in full sentences  Remainder of exam unable to be completed due to telephone visits          Phone call duration: 10 minutes

## 2023-11-25 ENCOUNTER — APPOINTMENT (OUTPATIENT)
Dept: GENERAL RADIOLOGY | Facility: CLINIC | Age: 83
End: 2023-11-25
Attending: EMERGENCY MEDICINE
Payer: MEDICARE

## 2023-11-25 ENCOUNTER — HOSPITAL ENCOUNTER (EMERGENCY)
Facility: CLINIC | Age: 83
Discharge: HOME | End: 2023-11-25
Attending: EMERGENCY MEDICINE | Admitting: EMERGENCY MEDICINE
Payer: MEDICARE

## 2023-11-25 VITALS
HEART RATE: 77 BPM | TEMPERATURE: 97.7 F | SYSTOLIC BLOOD PRESSURE: 156 MMHG | OXYGEN SATURATION: 97 % | DIASTOLIC BLOOD PRESSURE: 85 MMHG | RESPIRATION RATE: 18 BRPM

## 2023-11-25 DIAGNOSIS — J06.9 VIRAL UPPER RESPIRATORY INFECTION: ICD-10-CM

## 2023-11-25 DIAGNOSIS — H92.01 RIGHT EAR PAIN: ICD-10-CM

## 2023-11-25 DIAGNOSIS — R05.2 SUBACUTE COUGH: ICD-10-CM

## 2023-11-25 LAB
FLUAV RNA SPEC QL NAA+PROBE: NEGATIVE
FLUBV RNA RESP QL NAA+PROBE: NEGATIVE
RSV RNA SPEC NAA+PROBE: NEGATIVE
SARS-COV-2 RNA RESP QL NAA+PROBE: NEGATIVE

## 2023-11-25 PROCEDURE — 87637 SARSCOV2&INF A&B&RSV AMP PRB: CPT | Performed by: EMERGENCY MEDICINE

## 2023-11-25 PROCEDURE — 99284 EMERGENCY DEPT VISIT MOD MDM: CPT | Mod: 25

## 2023-11-25 PROCEDURE — 71046 X-RAY EXAM CHEST 2 VIEWS: CPT

## 2023-11-25 ASSESSMENT — ACTIVITIES OF DAILY LIVING (ADL): ADLS_ACUITY_SCORE: 35

## 2023-11-25 NOTE — ED TRIAGE NOTES
Pt c/o left ear pain. Started a year ago. Pain got worse last night. Reports recent cold symptoms, dry cough, mucous, difficulty sleeping, headache. Denies fever. Taking OTC cough syrup at home. A&OX4. Oromo  used for triage.      Triage Assessment (Adult)       Row Name 11/25/23 1022          Triage Assessment    Airway WDL WDL        Respiratory WDL    Respiratory WDL WDL        Skin Circulation/Temperature WDL    Skin Circulation/Temperature WDL WDL        Cardiac WDL    Cardiac WDL WDL        Peripheral/Neurovascular WDL    Peripheral Neurovascular WDL WDL        Cognitive/Neuro/Behavioral WDL    Cognitive/Neuro/Behavioral WDL WDL

## 2023-11-25 NOTE — DISCHARGE INSTRUCTIONS
Please return to the emergency department if your symptoms increase, you experience an increase in pain, shortness of breath.    You can use Debrox ear drops for the wax in your ears.  This will also come with a syringe that you can clean your ears out with.    You can use Mucinex for mucus.  You can use Flonase for head congestion.  You can use Zyrtec for head congestion.  Drink plenty of fluids.    Please follow-up with your primary care physician, call them Monday to schedule an appointment.

## 2024-03-12 ENCOUNTER — TELEPHONE (OUTPATIENT)
Dept: FAMILY MEDICINE | Facility: CLINIC | Age: 84
End: 2024-03-12

## 2024-04-09 ENCOUNTER — TELEPHONE (OUTPATIENT)
Dept: FAMILY MEDICINE | Facility: CLINIC | Age: 84
End: 2024-04-09

## 2024-04-09 NOTE — TELEPHONE ENCOUNTER
Left voicemail for Holly that we have not received paperwork.  To call [phone] with questions or fax paperwork to [phone]

## 2024-04-09 NOTE — TELEPHONE ENCOUNTER
[facility] Call Center    Phone Message    May a detailed message be left on voicemail: yes     Reason for Call: Form or Letter   Type or form/letter needing completion: Request for medical record  Provider: Dr. Hernandez  Date form needed: ASAP  Once completed: Fax form to: It should be on the form that is sent over  There should be a form being sent over from an adult  regarding medical records, but Holly wasn't sure exactly what it entailed. Please contact her if it hasn't been received.   Action Taken: Message routed to:  Other: S    Travel Screening: Not Applicable

## 2024-04-19 ENCOUNTER — TELEPHONE (OUTPATIENT)
Dept: FAMILY MEDICINE | Facility: CLINIC | Age: 84
End: 2024-04-19

## 2024-07-30 ENCOUNTER — TELEPHONE (OUTPATIENT)
Dept: FAMILY MEDICINE | Facility: CLINIC | Age: 84
End: 2024-07-30

## 2024-07-31 NOTE — TELEPHONE ENCOUNTER
Writer sent a [employer] message letting patient know that we do not need an appointment to fill out the forms.

## 2024-08-06 ENCOUNTER — TELEPHONE (OUTPATIENT)
Dept: OBGYN | Facility: CLINIC | Age: 84
End: 2024-08-06
Payer: MEDICARE

## 2024-08-06 NOTE — CONFIDENTIAL NOTE
PT's representative Holly called and wanted to know if the paperwork was finished and faxed off.  She stated that it needs to be faxed and received by 8/8

## 2024-08-08 ENCOUNTER — TELEPHONE (OUTPATIENT)
Dept: FAMILY MEDICINE | Facility: CLINIC | Age: 84
End: 2024-08-08

## 2024-08-08 NOTE — TELEPHONE ENCOUNTER
Special diet instruction request forms completed and signed by Dr. Caban due to Dr. Hernandez on leave. Forms faxed to [facility] ([phone]). Copy is with me.

## 2024-08-14 ENCOUNTER — TELEPHONE (OUTPATIENT)
Dept: FAMILY MEDICINE | Facility: CLINIC | Age: 84
End: 2024-08-14

## 2024-08-14 NOTE — TELEPHONE ENCOUNTER
Pt's representative, Holly, requested forms to be emailed to pt. Special diet request instruction forms were emailed to pt's email [email or web address].

## 2024-08-29 ENCOUNTER — TELEPHONE (OUTPATIENT)
Dept: OBGYN | Facility: CLINIC | Age: 84
End: 2024-08-29
Payer: MEDICARE

## 2024-08-29 NOTE — TELEPHONE ENCOUNTER
LVM with  for patient to re schedule return annual, as 8/30 annual was scheduled completely incorrectly.    Due to her age, she needs to see Family Medicine or Internal Medicine.  She is not a new patient as she is established with Dr. Hernandez.

## 2024-09-12 NOTE — PROGRESS NOTES
Encounter for pessary maintenance (Primary Dx); 7/2024  Uterovaginal prolapse, complete;  OAB (overactive bladder);  Urge incontinence;  Constipation, unspecified constipation type     Osteoarthritis of spine with radiculopathy, thoracolumbar region  Bilateral chronic knee pain  Chronic back and knee pain, with recent flare. Recent BMP reviewed and has normal kidney function. Can try 2 weeks of naproxen for pain relief. Referral placed to [facility] in [place] for further eval & mgmnt. Not able to be assessed today d/t phone visit.   - Orthopedic  Referral; Future  - naproxen (NAPROSYN) 500 MG tablet; Take 1 tablet (500 mg) by mouth 2 times daily (with meals) for 14 days     Hypercholesterolemia  Will switch back to atorvastatin but at higher dose of 40 mg daily (from 20 mg daily). I discussed with the patient the risks, benefits, and alternatives to taking this medication as well as common side effects. Has cardiology follow-up in 1/2024 and can recehck labs at that time.   - atorvastatin (LIPITOR) 40 MG tablet; Take 1 tablet (40 mg) by mouth daily       last wellness  was 8/2023   Chronic headache     Gastroesophageal reflux disease    Hyperlipidemia    Urinary incontinence    Vitamin D deficiency    DDD (degenerative disc disease), lumbar    Osteoarthritis of spine with radiculopathy, thoracolumbar region    Depressed mood    LVH (left ventricular hypertrophy)   Cardiology 8/2023  Mild LVH likely due to hypertension over time.   h     HTN blood pressure above goal, was normal at home per patient.   - Start low dose Losartan   - Med reconciliation pharmacy      HLD - not at goal   - Stop Atorvastatin and start pravastatin 20 mg   - Repeat lipid panel     Had ECHO 8/2023

## 2024-09-16 ENCOUNTER — OFFICE VISIT (OUTPATIENT)
Dept: FAMILY MEDICINE | Facility: CLINIC | Age: 84
End: 2024-09-16
Attending: INTERNAL MEDICINE
Payer: MEDICARE

## 2024-09-16 ENCOUNTER — TELEPHONE (OUTPATIENT)
Dept: OBGYN | Facility: CLINIC | Age: 84
End: 2024-09-16
Payer: MEDICARE

## 2024-09-16 ENCOUNTER — LAB (OUTPATIENT)
Dept: LAB | Facility: CLINIC | Age: 84
End: 2024-09-16
Attending: INTERNAL MEDICINE
Payer: MEDICARE

## 2024-09-16 VITALS
BODY MASS INDEX: 23.63 KG/M2 | SYSTOLIC BLOOD PRESSURE: 121 MMHG | HEART RATE: 80 BPM | WEIGHT: 128.4 LBS | HEIGHT: 62 IN | DIASTOLIC BLOOD PRESSURE: 76 MMHG

## 2024-09-16 DIAGNOSIS — R73.09 ELEVATED GLUCOSE: ICD-10-CM

## 2024-09-16 DIAGNOSIS — I51.7 LVH (LEFT VENTRICULAR HYPERTROPHY): ICD-10-CM

## 2024-09-16 DIAGNOSIS — E55.9 VITAMIN D DEFICIENCY: ICD-10-CM

## 2024-09-16 DIAGNOSIS — I50.32 CHRONIC HEART FAILURE WITH PRESERVED EJECTION FRACTION (HFPEF) (H): ICD-10-CM

## 2024-09-16 DIAGNOSIS — E78.00 HYPERCHOLESTEROLEMIA: ICD-10-CM

## 2024-09-16 DIAGNOSIS — Z00.00 ENCOUNTER FOR MEDICARE ANNUAL WELLNESS EXAM: Primary | ICD-10-CM

## 2024-09-16 DIAGNOSIS — Z78.9 NEED FOR FOLLOW-UP BY SOCIAL WORKER: Primary | ICD-10-CM

## 2024-09-16 DIAGNOSIS — Z12.31 ENCOUNTER FOR SCREENING MAMMOGRAM FOR BREAST CANCER: ICD-10-CM

## 2024-09-16 DIAGNOSIS — M47.25 OSTEOARTHRITIS OF SPINE WITH RADICULOPATHY, THORACOLUMBAR REGION: ICD-10-CM

## 2024-09-16 DIAGNOSIS — H44.002 INFECTION OF LEFT EYE: ICD-10-CM

## 2024-09-16 DIAGNOSIS — Z59.86 FINANCIAL INSECURITY: ICD-10-CM

## 2024-09-16 DIAGNOSIS — H61.23 BILATERAL IMPACTED CERUMEN: ICD-10-CM

## 2024-09-16 DIAGNOSIS — R06.09 DYSPNEA ON EXERTION: ICD-10-CM

## 2024-09-16 LAB
ANION GAP SERPL CALCULATED.3IONS-SCNC: 8 MMOL/L (ref 7–15)
BUN SERPL-MCNC: 14 MG/DL (ref 8–23)
CALCIUM SERPL-MCNC: 8.7 MG/DL (ref 8.8–10.4)
CHLORIDE SERPL-SCNC: 107 MMOL/L (ref 98–107)
CHOLEST SERPL-MCNC: 207 MG/DL
CREAT SERPL-MCNC: 0.53 MG/DL (ref 0.51–0.95)
EGFRCR SERPLBLD CKD-EPI 2021: 90 ML/MIN/1.73M2
FASTING STATUS PATIENT QL REPORTED: NO
FASTING STATUS PATIENT QL REPORTED: NO
GLUCOSE SERPL-MCNC: 98 MG/DL (ref 70–99)
HBA1C MFR BLD: 5.6 %
HCO3 SERPL-SCNC: 24 MMOL/L (ref 22–29)
HDLC SERPL-MCNC: 58 MG/DL
LDLC SERPL CALC-MCNC: 123 MG/DL
NONHDLC SERPL-MCNC: 149 MG/DL
POTASSIUM SERPL-SCNC: 4 MMOL/L (ref 3.4–5.3)
SODIUM SERPL-SCNC: 139 MMOL/L (ref 135–145)
TRIGL SERPL-MCNC: 128 MG/DL
VIT D+METAB SERPL-MCNC: 24 NG/ML (ref 20–50)

## 2024-09-16 PROCEDURE — 82306 VITAMIN D 25 HYDROXY: CPT

## 2024-09-16 PROCEDURE — G0463 HOSPITAL OUTPT CLINIC VISIT: HCPCS | Performed by: FAMILY MEDICINE

## 2024-09-16 PROCEDURE — 83036 HEMOGLOBIN GLYCOSYLATED A1C: CPT

## 2024-09-16 PROCEDURE — 36415 COLL VENOUS BLD VENIPUNCTURE: CPT

## 2024-09-16 PROCEDURE — 80048 BASIC METABOLIC PNL TOTAL CA: CPT

## 2024-09-16 PROCEDURE — G0439 PPPS, SUBSEQ VISIT: HCPCS | Performed by: FAMILY MEDICINE

## 2024-09-16 PROCEDURE — 80061 LIPID PANEL: CPT

## 2024-09-16 RX ORDER — MULTIVIT-MIN/IRON/FOLIC ACID/K 18-600-40
1 CAPSULE ORAL DAILY
Qty: 90 CAPSULE | Refills: 1 | Status: SHIPPED | OUTPATIENT
Start: 2024-09-16 | End: 2024-09-16

## 2024-09-16 RX ORDER — TROSPIUM CHLORIDE 20 MG/1
20 TABLET, FILM COATED ORAL
COMMUNITY
Start: 2024-08-02 | End: 2025-08-02

## 2024-09-16 RX ORDER — POLYETHYLENE GLYCOL 3350 17 G/17G
17 POWDER, FOR SOLUTION ORAL
COMMUNITY
Start: 2024-07-10

## 2024-09-16 RX ORDER — ERYTHROMYCIN 5 MG/G
OINTMENT OPHTHALMIC 2 TIMES DAILY
Qty: 3.5 G | Refills: 1 | Status: SHIPPED | OUTPATIENT
Start: 2024-09-16 | End: 2024-09-16

## 2024-09-16 RX ORDER — LOSARTAN POTASSIUM 25 MG/1
25 TABLET ORAL DAILY
Qty: 90 TABLET | Refills: 1 | Status: SHIPPED | OUTPATIENT
Start: 2024-09-16 | End: 2024-09-16

## 2024-09-16 RX ORDER — AMOXICILLIN 250 MG
1 CAPSULE ORAL DAILY
COMMUNITY
Start: 2024-08-02

## 2024-09-16 RX ORDER — ATORVASTATIN CALCIUM 40 MG/1
40 TABLET, FILM COATED ORAL DAILY
Qty: 90 TABLET | Refills: 0 | Status: SHIPPED | OUTPATIENT
Start: 2024-09-16 | End: 2024-09-16

## 2024-09-16 RX ORDER — ERYTHROMYCIN 5 MG/G
OINTMENT OPHTHALMIC 2 TIMES DAILY
Qty: 3.5 G | Refills: 1 | Status: SHIPPED | OUTPATIENT
Start: 2024-09-16

## 2024-09-16 RX ORDER — ATORVASTATIN CALCIUM 40 MG/1
40 TABLET, FILM COATED ORAL DAILY
Qty: 90 TABLET | Refills: 0 | Status: SHIPPED | OUTPATIENT
Start: 2024-09-16

## 2024-09-16 RX ORDER — LOSARTAN POTASSIUM 25 MG/1
25 TABLET ORAL DAILY
Qty: 90 TABLET | Refills: 1 | Status: SHIPPED | OUTPATIENT
Start: 2024-09-16

## 2024-09-16 RX ORDER — MULTIVIT-MIN/IRON/FOLIC ACID/K 18-600-40
1 CAPSULE ORAL DAILY
Qty: 90 CAPSULE | Refills: 1 | Status: SHIPPED | OUTPATIENT
Start: 2024-09-16

## 2024-09-16 SDOH — ECONOMIC STABILITY - INCOME SECURITY: FINANCIAL INSECURITY: Z59.86

## 2024-09-16 NOTE — NURSING NOTE
Oromo  was used today, audio only for both uses.  's first name: Natalie #[id number]  Second  was used: Aubrey #[id number]

## 2024-09-16 NOTE — TELEPHONE ENCOUNTER
Writer got a call from Rooming staff working with Dr. Sorenson. Dr. Sorenson requesting assistance placing social work consult for pt for assistance with food, housing and transportation. Dr. Sorenson spoke to pt about this and how this would be helpful for pt at this time. Order placed under Dr. Sorenson.

## 2024-09-16 NOTE — PROGRESS NOTES
Preventive Care Visit  [facility]  Michelle Sorenson MD PhD, Family Medicine  Sep 16, 2024      Assessment & Plan     Osteoarthritis of spine with radiculopathy, thoracolumbar region  Ongoing symptoms - tylenol as needed     Hypercholesterolemia  She should be on atorvastatin 40mg/day - this was refilled - not clear if she was on this  - Lipid Panel  - atorvastatin (LIPITOR) 40 MG tablet  Dispense: 90 tablet; Refill: 0    LVH (left ventricular hypertrophy)  Had ECHO last 8/2023 - has seen cardiology - LVH is mild although EF is 55% - not clear if she has been taking losartan   - losartan (COZAAR) 25 MG tablet  Dispense: 90 tablet; Refill: 1    Encounter for Medicare annual wellness exam  Difficult to evaluate her nutrition, - she lives alone - we did ask social service contact her to see what support services she might need - needs  a life line.    Vitamin D deficiency  Has been low in the past - not clear if was taking vit D  - will recheck  - Cholecalciferol (VITAMIN D) 50 MCG (2000 UT) CAPS  Dispense: 90 capsule; Refill: 1  - 25- [place]-Vitamin D    Elevated glucose  Will check A1C - 5.6 today - normal  - Basic Metabolic Panel  - Hemoglobin A1c    Encounter for screening mammogram for breast cancer  Ordered - discussed if she wanted this and she said yes   - MA Screen Bilateral w/Kristofer    Dyspnea on exertion  Had seen cards about this - see ECHO result  - losartan (COZAAR) 25 MG tablet  Dispense: 90 tablet; Refill: 1    Chronic heart failure with preserved ejection fraction (HFpEF) (H)  Per cardiology - refilled losartan   - losartan (COZAAR) 25 MG tablet  Dispense: 90 tablet; Refill: 1    Bilateral impacted cerumen  This was her main concern - she feels debrox doesn't help - wax looks dry  - would continue debrox and then see ENT - Not sure she understands debrox won't cure and get rid of the wax but rather soften it   - Adult ENT  Referral    Infection of left  eye  Does have lid swelling left eye - talked about warm soaks and refilled antibiotic.  She was seen at [facility] about 1 month ago according to daughter.    - erythromycin (ROMYCIN) 5 MG/GM ophthalmic ointment  Dispense: 3.5 g; Refill: 1    Return in about 3 months (around 12/16/2024).    Subjective   Abdoulaye is a 84 year old, presenting for the following  Physical  worked through  -       Health Care Directive  Patient does not have a Health Care Directive or Living Will: Discussed advance care planning with patient; information given to patient to review.    HPI  Pt c/o of eye problem - using an antibiotic cream - has chronic swelling especially left eye  has been using an antibiotic cream     Pt has wax in ears - has tried debrox but doesn't work - had seen  ENT in the past.  Now also has pain.      Using a pessary.  Needs a new one - missed appt with Dr Alfredo  and plans to reschedule     HTN - is on medication  - had ECHO 8/2023 has LVH mild - EF mildly impaired     Vitamin D deficiency - last value 30  - 9/2023  not taking vitamin D  will recheck    HCM Doesn't want flu shot nor mammogram    Hyperlipidemia  - on lipitor 40 mg  -  needs lipid     Pt could not do the ipad and was not done with         No data to display                   No data to display                   No data to display                      No data to display                   No data to display                   No data to display                     No data to display                       Today's PHQ-2 Score:       8/29/2023     3:23 PM   PHQ-2 ( 1999 Pf[employer])   Q1: Little interest or pleasure in doing things 0   Q2: Feeling down, depressed or hopeless 0   PHQ-2 Score 0          No data to display              Social History     Tobacco Use    Smoking status: Never    Smokeless tobacco: Never   Substance Use Topics    Alcohol use: Never    Drug use: Never          Mammogram Screening - Mammography discussed and  "declined          Reviewed and updated as needed this visit by Provider                      Current providers sharing in care for this patient include:  Patient Care Team:  Gisela Hernandez MD as PCP - General (Family Medicine)  Jarrod Yadav MD as PCP - Orthopaedics (Family Medicine - Sports Medicine)  Greyson Lowry MD as MD (Cardiovascular Disease)  Arnel Deshpande MD as MD (Cardiovascular Disease)  Arnel Deshpande MD as Assigned Heart and Vascular Provider  Yareli Kwong, RN as Specialty Care Coordinator  Jarrod Garcia [facility] as Pharmacist (Pharmacist)  Gisela Hernandez MD as Assigned PCP    The following health maintenance items are reviewed in Epic and correct as of today:  Health Maintenance   Topic Date Due    DEXA  Never done    HF ACTION PLAN  Never done    ADVANCE CARE PLANNING  Never done    DTAP/TDAP/TD IMMUNIZATION (1 - Tdap) Never done    ZOSTER IMMUNIZATION (1 of 2) Never done    RSV VACCINE (1 - 1-dose 75+ series) Never done    PHQ-2 (once per calendar year)  01/01/2024    BMP  03/15/2024    LIPID  08/23/2024    FALL RISK ASSESSMENT  08/29/2024    INFLUENZA VACCINE (1) 09/01/2024    COVID-19 Vaccine (3 - 2024-25 season) 09/01/2024    ALT  09/15/2024    CBC  09/15/2024    MEDICARE ANNUAL WELLNESS VISIT  08/29/2024    TSH W/FREE T4 REFLEX  Completed    Pneumococcal Vaccine: 65+ Years  Completed    HPV IMMUNIZATION  Aged Out    MENINGITIS IMMUNIZATION  Aged Out    RSV MONOCLONAL ANTIBODY  Aged Out    URINE DRUG SCREEN  Discontinued         Review of Systems  Constitutional, neuro, ENT, endocrine, pulmonary, cardiac, gastrointestinal, genitourinary, musculoskeletal, integument and psychiatric systems are negative, except as otherwise noted.     Objective    Exam  /76   Pulse 80   Ht 1.562 m (5' 1.5\")   Wt 58.2 kg (128 lb 6.4 oz)   BMI 23.87 kg/m     Estimated body mass index is 23.87 kg/m  as calculated from the following:    Height as of this " "encounter: 1.562 m (5' 1.5\").    Weight as of this encounter: 58.2 kg (128 lb 6.4 oz).    Physical Exam  GENERAL: alert and no distress  EYES: swelling both lids - L>R  and has mattered eyelashes on left  + rebecca cataracts - PERRL   HENT: ear canals plugged bilaterally with wax - nose and mouth without ulcers or lesions - missing several teeth   NECK: no adenopathy, no asymmetry, masses, or scars  RESP: poor airway movement - no rales, rhonchi or wheezes  BREAST: normal without masses, tenderness or nipple discharge and no palpable axillary masses or adenopathy  CV: regular rate and rhythm, normal S1 S2, no S3 or S4, no murmur, click or rub, no peripheral edema  ABDOMEN: soft, nontender, no hepatosplenomegaly, no masses and bowel sounds normal  MS: no gross musculoskeletal defects noted, no edema  SKIN: no suspicious lesions or rashes  NEURO: generalized weakness 4/5 UE and LE - unsteady gait, mentation intact and speech normal  PSYCH: mentation appears normal, affect normal/bright  LYMPH: no cervical, supraclavicular, axillary,adenopathy            9/16/2024   Mini Cog   Mini-Cog Not Completed (choose reason) Language barrier and no  present                 Signed Electronically by: Michelle Sorenson MD PhD    "

## 2024-09-16 NOTE — PATIENT INSTRUCTIONS
Nursing will call you about help with services and a life line    Blood work today  Medications   Ear appointment for the wax removal - use debrox to soften the wax   Use warm towel to left eye for 10 min twice daily    Doesn't want mammogram    Reschedule for the pessary    Take your blood pressure meds and vitamin D     See Dr Hernandez 3 months              Thank you for trusting us with your care!   Please be aware, if you are on Mychart, you may see your results prior to your providers review. If labs are abnormal, we will call or message you on Mychart with a follow up plan.    If you need to contact us for questions about:  Symptoms, Scheduling & Medical Questions; Non-urgent (2-3 day response) Fitly message, Urgent (needing response today) [phone] (if after 3:30pm next day response)   Prescriptions: Please call your Pharmacy   Billing: Kike [phone] or  Physicians:[phone]     Patient Education   Preventive Care Advice   This is general advice given by our system to help you stay healthy. However, your care team may have specific advice just for you. Please talk to your care team about your preventive care needs.  Nutrition  Eat 5 or more servings of fruits and vegetables each day.  Try wheat bread, brown rice and whole grain pasta (instead of white bread, rice, and pasta).  Get enough calcium and vitamin D. Check the label on foods and aim for 100% of the RDA (recommended daily allowance).  Lifestyle  Exercise at least 150 minutes each week  (30 minutes a day, 5 days a week).  Do muscle strengthening activities 2 days a week. These help control your weight and prevent disease.  No smoking.  Wear sunscreen to prevent skin cancer.  Have a dental exam and cleaning every 6 months.  Yearly exams  See your health care team every year to talk about:  Any changes in your health.  Any medicines your care team has prescribed.  Preventive care, family planning, and ways to prevent chronic  diseases.  Shots (vaccines)   HPV shots (up to age 26), if you've never had them before.  Hepatitis B shots (up to age 59), if you've never had them before.  COVID-19 shot: Get this shot when it's due.  Flu shot: Get a flu shot every year.  Tetanus shot: Get a tetanus shot every 10 years.  Pneumococcal, hepatitis A, and RSV shots: Ask your care team if you need these based on your risk.  Shingles shot (for age 50 and up)  General health tests  Diabetes screening:  Starting at age 35, Get screened for diabetes at least every 3 years.  If you are younger than age 35, ask your care team if you should be screened for diabetes.  Cholesterol test: At age 39, start having a cholesterol test every 5 years, or more often if advised.  Bone density scan (DEXA): At age 50, ask your care team if you should have this scan for osteoporosis (brittle bones).  Hepatitis C: Get tested at least once in your life.  STIs (sexually transmitted infections)  Before age 24: Ask your care team if you should be screened for STIs.  After age 24: Get screened for STIs if you're at risk. You are at risk for STIs (including HIV) if:  You are sexually active with more than one person.  You don't use condoms every time.  You or a partner was diagnosed with a sexually transmitted infection.  If you are at risk for HIV, ask about PrEP medicine to prevent HIV.  Get tested for HIV at least once in your life, whether you are at risk for HIV or not.  Cancer screening tests  Cervical cancer screening: If you have a cervix, begin getting regular cervical cancer screening tests starting at age 21.  Breast cancer scan (mammogram): If you've ever had breasts, begin having regular mammograms starting at age 40. This is a scan to check for breast cancer.  Colon cancer screening: It is important to start screening for colon cancer at age 45.  Have a colonoscopy test every 10 years (or more often if you're at risk) Or, ask your provider about stool tests like a  FIT test every year or Cologuard test every 3 years.  To learn more about your testing options, visit:   .  For help making a decision, visit:   https://bit.ly/xx26148.  Prostate cancer screening test: If you have a prostate, ask your care team if a prostate cancer screening test (PSA) at age 55 is right for you.  Lung cancer screening: If you are a current or former smoker ages 50 to 80, ask your care team if ongoing lung cancer screenings are right for you.  For informational purposes only. Not to replace the advice of your health care provider. Copyright   2023 [employer]. All rights reserved. Clinically reviewed by the [facility] Transitions Program. [place] [id number] - REV 01/24.  Eating Healthy Foods: Care Instructions  With every meal, you can make healthy food choices. Try to eat a variety of fruits, vegetables, whole grains, lean proteins, and low-fat dairy products. This can help you get the right balance of nutrients, including vitamins and minerals. Small changes add up over time. You can start by adding one healthy food to your meals each day.    Try to make half your plate fruits and vegetables, one-fourth whole grains, and one-fourth lean proteins. Try including dairy with your meals.   Eat more fruits and vegetables. Try to have them with most meals and snacks.   Foods for healthy eating    Fruits    These can be fresh, frozen, canned, or dried.  Try to choose whole fruit rather than fruit juice.  Eat a variety of colors.    Vegetables    These can be fresh, frozen, canned, or dried.  Beans, peas, and lentils count too.    Whole grains    Choose whole-grain breads, cereals, and noodles.  Try brown rice.    Lean proteins    These can include lean meat, poultry, fish, and eggs.  You can also have tofu, beans, peas, lentils, nuts, and seeds.    Dairy    Try milk, yogurt, and cheese.  Choose low-fat or fat-free when you can.  If you need to, use lactose-free milk or fortified  "plant-based milk products, such as soy milk.    Water    Drink water when you're thirsty.  Limit sugar-sweetened drinks, including soda, fruit drinks, and sports drinks.  Where can you learn more?  Go to https://www.[employer].net/patiented  Enter T756 in the search box to learn more about \"Eating Healthy Foods: Care Instructions.\"  Current as of: September 20, 2023               Content Version: 14.0    9456-5307 [employer].   Care instructions adapted under license by your healthcare professional. If you have questions about a medical condition or this instruction, always ask your healthcare professional. [employer] disclaims any warranty or liability for your use of this information.      Learning About Being Physically Active  What is physical activity?     Being physically active means doing any kind of activity that gets your body moving.  The types of physical activity that can help you get fit and stay healthy include:  Aerobic or \"cardio\" activities. These make your heart beat faster and make you breathe harder, such as brisk walking, riding a bike, or running. They strengthen your heart and lungs and build up your endurance.  Strength training activities. These make your muscles work against, or \"resist,\" something. Examples include lifting weights or doing push-ups. These activities help tone and strengthen your muscles and bones.  Stretches. These let you move your joints and muscles through their full range of motion. Stretching helps you be more flexible.  Reaching a balance between these three types of physical activity is important because each one contributes to your overall fitness.  What are the benefits of being active?  Being active is one of the best things you can do for your health. It helps you to:  Feel stronger and have more energy to do all the things you like to do.  Focus better at school or work.  Feel, think, and sleep better.  Reach and stay at a healthy " "weight.  Lose fat and build lean muscle.  Lower your risk for serious health problems, including diabetes, heart attack, high blood pressure, and some cancers.  Keep your heart, lungs, bones, muscles, and joints strong and healthy.  How can you make being active part of your life?  Start slowly. Make it your long-term goal to get at least 30 minutes of exercise on most days of the week. Walking is a good choice. You also may want to do other activities, such as running, swimming, cycling, or playing tennis or team sports.  Pick activities that you like--ones that make your heart beat faster, your muscles stronger, and your muscles and joints more flexible. If you find more than one thing you like doing, do them all. You don't have to do the same thing every day.  Get your heart pumping every day. Any activity that makes your heart beat faster and keeps it at that rate for a while counts.  Here are some great ways to get your heart beating faster:  Go for a brisk walk, run, or hike.  Go for a swim or bike ride.  Take an online exercise class or dance.  Play a game of touch football, basketball, or soccer.  Play tennis, pickleball, or racquetball.  Climb stairs.  Even some household chores can be aerobic. Just do them at a faster pace. Raking or mowing the lawn, sweeping the garage, and vacuuming and cleaning your home all can help get your heart rate up.  Strengthen your muscles during the week. You don't have to lift heavy weights or grow big, bulky muscles to get stronger. Doing a few simple activities that make your muscles work against, or \"resist,\" something can help you get stronger. Aim for at least twice a week.  For example, you can:  Do push-ups or sit-ups, which use your own body weight as resistance.  Lift weights or dumbbells or use stretch bands at home or in a gym or community center.  Stretch your muscles often. Stretching will help you as you become more active. It can help you stay flexible and " "loosen tight muscles. It can also help improve your balance and posture and can be a great way to relax.  Be sure to stretch the muscles you'll be using when you work out. It's best to warm your muscles slightly before you stretch them. Walk or do some other light aerobic activity for a few minutes. Then start stretching.  When you stretch your muscles:  Do it slowly. Stretching is not about going fast or making sudden movements.  Don't push or bounce during a stretch.  Hold each stretch for at least 15 to 30 seconds, if you can. You should feel a stretch in the muscle, but not pain.  Breathe out as you do the stretch. Then breathe in as you hold the stretch. Don't hold your breath.  If you're worried about how more activity might affect your health, have a checkup before you start. Follow any special advice your doctor gives you for getting a smart start.  Where can you learn more?  Go to https://www.[employer]/patiented  Enter W332 in the search box to learn more about \"Learning About Being Physically Active.\"  Current as of: June 5, 2023  Content Version: 14.1 2006-2024 [employer].   Care instructions adapted under license by your healthcare professional. If you have questions about a medical condition or this instruction, always ask your healthcare professional. [employer] disclaims any warranty or liability for your use of this information.    Learning About Being Active as an Older Adult  Why is being active important as you get older?     Being active is one of the best things you can do for your health. And it's never too late to start. Being active--or getting active, if you aren't already--has definite benefits. It can:  Give you more energy,  Keep your mind sharp.  Improve balance to reduce your risk of falls.  Help you manage chronic illness with fewer medicines.  No matter how old you are, how fit you are, or what health problems you have, there is a form of activity " that will work for you. And the more physical activity you can do, the better your overall health will be.  What kinds of activity can help you stay healthy?  Being more active will make your daily activities easier. Physical activity includes planned exercise and things you do in daily life. There are four types of activity:  Aerobic.  Doing aerobic activity makes your heart and lungs strong.  Includes walking, dancing, and gardening.  Aim for at least 2  hours spread throughout the week.  It improves your energy and can help you sleep better.  Muscle-strengthening.  This type of activity can help maintain muscle and strengthen bones.  Includes climbing stairs, using resistance bands, and lifting or carrying heavy loads.  Aim for at least twice a week.  It can help protect the knees and other joints.  Stretching.  Stretching gives you better range of motion in joints and muscles.  Includes upper arm stretches, calf stretches, and gentle yoga.  Aim for at least twice a week, preferably after your muscles are warmed up from other activities.  It can help you function better in daily life.  Balancing.  This helps you stay coordinated and have good posture.  Includes heel-to-toe walking, leonela chi, and certain types of yoga.  Aim for at least 3 days a week.  It can reduce your risk of falling.  Even if you have a hard time meeting the recommendations, it's better to be more active than less active. All activity done in each category counts toward your weekly total. You'd be surprised how daily things like carrying groceries, keeping up with grandchildren, and taking the stairs can add up.  What keeps you from being active?  If you've had a hard time being more active, you're not alone. Maybe you remember being able to do more. Or maybe you've never thought of yourself as being active. It's frustrating when you can't do the things you want. Being more active can help. What's holding you back?  Getting started.  Have a  "goal, but break it into easy tasks. Small steps build into big accomplishments.  Staying motivated.  If you feel like skipping your activity, remember your goal. Maybe you want to move better and stay independent. Every activity gets you one step closer.  Not feeling your best.  Start with 5 minutes of an activity you enjoy. Prove to yourself you can do it. As you get comfortable, increase your time.  You may not be where you want to be. But you're in the process of getting there. Everyone starts somewhere.  How can you find safe ways to stay active?  Talk with your doctor about any physical challenges you're facing. Make a plan with your doctor if you have a health problem or aren't sure how to get started with activity.  If you're already active, ask your doctor if there is anything you should change to stay safe as your body and health change.  If you tend to feel dizzy after you take medicine, avoid activity at that time. Try being active before you take your medicine. This will reduce your risk of falls.  If you plan to be active at home, make sure to clear your space before you get started. Remove things like TV cords, coffee tables, and throw rugs. It's safest to have plenty of space to move freely.  The key to getting more active is to take it slow and steady. Try to improve only a little bit at a time. Pick just one area to improve on at first. And if an activity hurts, stop and talk to your doctor.  Where can you learn more?  Go to https://www.[employer].net/patiented  Enter P600 in the search box to learn more about \"Learning About Being Active as an Older Adult.\"  Current as of: June 5, 2023  Content Version: 14.1 2006-2024 [employer].   Care instructions adapted under license by your healthcare professional. If you have questions about a medical condition or this instruction, always ask your healthcare professional. [employer] disclaims any warranty or liability for your " use of this information.

## 2024-09-17 ENCOUNTER — PATIENT OUTREACH (OUTPATIENT)
Dept: CARE COORDINATION | Facility: CLINIC | Age: 84
End: 2024-09-17
Payer: MEDICARE

## 2024-09-17 NOTE — PROGRESS NOTES
Clinic Care Coordination Contact  [facility]/Voicemail    Clinical Data: Care Coordinator Outreach    Outreach Documentation Number of Outreach Attempt   9/17/2024   3:19 PM 1       MARLO ROMERO and [employer]  ID #[id number] left message on patient's voicemail with call back information and requested return call.    Referral received from  with S for SDOH concerns.    Plan: Care Coordinator will try to reach patient again in 1-2 business days.    MELIDA Daigle  , Care Coordination  [facility]  [facility]  [facility]  [phone]

## 2024-09-19 ENCOUNTER — PATIENT OUTREACH (OUTPATIENT)
Dept: CARE COORDINATION | Facility: CLINIC | Age: 84
End: 2024-09-19
Payer: MEDICARE

## 2024-09-19 NOTE — LETTER
[facility] COORDINATION  [address] [facility] [address]    September 19, 2024    Abdoulaye Peguero  [address]   [facility] [address]      Dear Abdoulaye,    I have been attempting to reach you since our last contact. I would like to continue to work with you and provide any additional support you may need on achieving your health care related goals. I would appreciate if you would give me a call at [phone] to let me know if you would like to continue working together. I know that there are many things that can affect our ability to communicate and I hope we can continue to work together.    All of us at the [facility] are invested in your health and are here to assist you in meeting your goals.     Sincerely,    MELIDA Daigle  , Care Coordination  [facility]  [facility]  [facility]  [phone]

## 2024-09-19 NOTE — PROGRESS NOTES
Clinic Care Coordination Contact  [facility]/Voicemail    Clinical Data: Care Coordinator Outreach    Outreach Documentation Number of Outreach Attempt   9/17/2024   3:19 PM 1   9/19/2024   2:35 PM 2         MARLO ROMERO and [employer]  ID #[id number] left message on patient's voicemail with call back information and requested return call.    Referral received from  with S for SDOH concerns.      Plan: Care Coordinator will send unable to contact letter with care coordinator contact information via Xi3. Care Coordinator will do no further outreaches at this time.    MELIDA Daigle  , Care Coordination  [facility]  [facility]  [facility]  [phone]

## 2025-02-28 NOTE — PROGRESS NOTES
Assessment & Plan     Excessive cerumen in both ear canals  Pt has excessive wax both ears - gave her debrox - told to return for ear washing but pt is resistant to this   - carbamide peroxide (DEBROX) 6.5 % otic solution  Dispense: 15 mL; Refill: 1    Encounter for screening involving social determinants of health (SDoH)  She is living alone in assisted living - place social service referral to see if she needs further services   - Primary Care - Care Coordination Referral    DDD (degenerative disc disease), thoracic  She has concern for pain mid back - this is likely DDD - tylenol and voltaren gel  - diclofenac (VOLTAREN) 1 % topical gel  Dispense: 100 g; Refill: 2    Pneumonia of upper lobe due to infectious organism, unspecified laterality  Recent pneumonia - treated with antibiotics - has recovered - will get f/up  CXR in 2 wks   - X-ray Chest 2 Views    Vitamin D deficiency  Hx of vit D deficiency - refilled script - encouraged to take vit D  - vitamin D3 (CHOLECALCIFEROL) 50 mcg (2000 units) tablet  Dispense: 90 tablet; Refill: 3    Recent urinary tract infection  Hx of prolapse and recent UTI - - will recheck - using a pessary,  was on trospium for overactive bladder - but not taking this   - Routine UA with micro reflex to culture    Chronic heart failure with preserved ejection fraction (HFpEF) (H)  Has seen cardiology in the past and is on cozaar - not sure she is taking this    Type 2 diabetes mellitus without complication, without long-term current use of insulin (H)  Unclear if she has diabetes - A1C in 9/2024 was normal  but on her med list she is on dapagliflozin (FARXIGA) 10 MG TABS tablet  - although she is not taking it?  Will recheck A1C    - Hemoglobin A1c  - Basic Metabolic Panel      A form for [facility] was completed      Time note (e5, 40'): The total of my time (on the date of service) for this service was 60 minutes, including discussion/face-to-face, chart review,  interpretation not otherwise reported, documentation, and updating of the computerized record.    The longitudinal plan of care for the diagnosis(es)/condition(s) as documented were addressed during this visit. Due to the added complexity in care, I will continue to support Abdoulaye in the subsequent management and with ongoing continuity of care.          Return in about 6 months (around 9/3/2025) for Routine preventive.    Subjective   Abdoulaye is a 84 year old, presenting for the following health issues:  F/up  pneumonia and form to fill out   Interview done with      HPI  83 yo female seen at an [facility] on 2/18/25 for multiple issues: body aches, fatigue, malaise, decreased appetite, abdominal cramping with eating, dizziness, cough, chest congestion, congestion, sore throat, generalized weakness, nausea, vomiting, diarrhea for the past 7-8 days. Labs showed elevated glucose, mildly low hgb - CXR  some evidence of infiltrate - UA looked positive : she responded to a duo-neb - clinical decision was pneumonia and UTI   She was given azithromycin, and cefdinir.  She completed the antibiotics  and feeling much better - not coughing, no fever, no chills.  No burning when urinates. She is eating.   She has a form to fill out       Diabetes - Had been on a diabetic med dapagliflozin  but not taking it.  Had A1C in 9/2024  was 5.6     Vaginal prolapse - has a pessary       Hyperlipidemia - on a statin  40mg lipitor     LVH- Had ECHO last 8/2023 - has seen cardiology - LVH is mild although EF is 55% - not clear if she has been taking losartan     Low vit D - vit D was 24  9/2024     She lives alone in assisted living - gets meals - has a cousin in the area - took a taxi here.                Review of Systems  Constitutional, HEENT, cardiovascular, pulmonary, GI, , musculoskeletal, neuro, skin, endocrine and psych systems are negative, except as otherwise noted.      Objective    /69   Pulse 88   Ht  "1.562 m (5' 1.5\")   Wt 56 kg (123 lb 6.4 oz)   BMI 22.94 kg/m    Body mass index is 22.94 kg/m .  Physical Exam   GENERAL: alert and no distress  EYES: Eyes bilateral cataracts and   grossly normal to inspection, PERRL and conjunctivae and sclerae normal  HENT: ear canals filled with wax bilateral, missing teeth - does have caps on upper front teeth  and mouth without ulcers or lesions  NECK: no adenopathy, no asymmetry, masses, or scars  RESP: lungs clear to auscultation - no rales, rhonchi or wheezes  BREAST: normal without masses, tenderness or nipple discharge and no palpable axillary masses or adenopathy  CV: regular rate and rhythm, normal S1 S2, no S3 or S4, no murmur, click or rub, no peripheral edema  ABDOMEN: soft, nontender, no hepatosplenomegaly, no masses and bowel sounds normal  MS: no gross musculoskeletal defects noted, no edema  SKIN: no suspicious lesions or rashes  NEURO:diminished UE and LE strength 4/5 and tone, mentation intact and speech normal antalgic gait - uses a cane   PSYCH: mentation appears normal, affect normal/bright  LYMPH: no cervical, supraclavicular, adenopathy            Signed Electronically by: Michelle Sorenson MD PhD      "

## 2025-03-03 ENCOUNTER — DOCUMENTATION ONLY (OUTPATIENT)
Dept: OBGYN | Facility: CLINIC | Age: 85
End: 2025-03-03
Payer: MEDICARE

## 2025-03-03 ENCOUNTER — OFFICE VISIT (OUTPATIENT)
Dept: FAMILY MEDICINE | Facility: CLINIC | Age: 85
End: 2025-03-03
Attending: FAMILY MEDICINE
Payer: MEDICARE

## 2025-03-03 ENCOUNTER — PATIENT OUTREACH (OUTPATIENT)
Dept: CARE COORDINATION | Facility: CLINIC | Age: 85
End: 2025-03-03

## 2025-03-03 VITALS
DIASTOLIC BLOOD PRESSURE: 69 MMHG | SYSTOLIC BLOOD PRESSURE: 117 MMHG | WEIGHT: 123.4 LBS | BODY MASS INDEX: 22.71 KG/M2 | HEART RATE: 88 BPM | HEIGHT: 62 IN

## 2025-03-03 DIAGNOSIS — E11.9 TYPE 2 DIABETES MELLITUS WITHOUT COMPLICATION, WITHOUT LONG-TERM CURRENT USE OF INSULIN (H): ICD-10-CM

## 2025-03-03 DIAGNOSIS — H61.23 EXCESSIVE CERUMEN IN BOTH EAR CANALS: ICD-10-CM

## 2025-03-03 DIAGNOSIS — I50.32 CHRONIC HEART FAILURE WITH PRESERVED EJECTION FRACTION (HFPEF) (H): ICD-10-CM

## 2025-03-03 DIAGNOSIS — E55.9 VITAMIN D DEFICIENCY: ICD-10-CM

## 2025-03-03 DIAGNOSIS — J18.9 PNEUMONIA OF UPPER LOBE DUE TO INFECTIOUS ORGANISM, UNSPECIFIED LATERALITY: Primary | ICD-10-CM

## 2025-03-03 DIAGNOSIS — M51.34 DDD (DEGENERATIVE DISC DISEASE), THORACIC: ICD-10-CM

## 2025-03-03 DIAGNOSIS — Z87.440 RECENT URINARY TRACT INFECTION: ICD-10-CM

## 2025-03-03 DIAGNOSIS — Z13.9 ENCOUNTER FOR SCREENING INVOLVING SOCIAL DETERMINANTS OF HEALTH (SDOH): ICD-10-CM

## 2025-03-03 PROCEDURE — 3074F SYST BP LT 130 MM HG: CPT | Performed by: FAMILY MEDICINE

## 2025-03-03 PROCEDURE — 99417 PROLNG OP E/M EACH 15 MIN: CPT | Performed by: FAMILY MEDICINE

## 2025-03-03 PROCEDURE — 3078F DIAST BP <80 MM HG: CPT | Performed by: FAMILY MEDICINE

## 2025-03-03 PROCEDURE — G0463 HOSPITAL OUTPT CLINIC VISIT: HCPCS | Performed by: FAMILY MEDICINE

## 2025-03-03 PROCEDURE — G2211 COMPLEX E/M VISIT ADD ON: HCPCS | Performed by: FAMILY MEDICINE

## 2025-03-03 PROCEDURE — 99215 OFFICE O/P EST HI 40 MIN: CPT | Performed by: FAMILY MEDICINE

## 2025-03-03 RX ORDER — CHOLECALCIFEROL (VITAMIN D3) 50 MCG
1 TABLET ORAL DAILY
Qty: 90 TABLET | Refills: 3 | Status: SHIPPED | OUTPATIENT
Start: 2025-03-03

## 2025-03-03 RX ORDER — ALBUTEROL SULFATE 90 UG/1
1-2 INHALANT RESPIRATORY (INHALATION)
COMMUNITY
Start: 2025-02-18

## 2025-03-03 NOTE — NURSING NOTE
[employer] (iPad) audio only was used during today's visit.     name: [facility]  ID number: [id number]

## 2025-03-03 NOTE — PROGRESS NOTES
Pt brought in forms to be completed by physician.  Forms filled out and reviewed by writer and Dr. Michelle Sorenson.  Provided a copy to pt. Original with writer.

## 2025-03-03 NOTE — PROGRESS NOTES
Clinic Care Coordination Contact  [facility]/Voicemail    Clinical Data: Care Coordinator Outreach    Outreach Documentation Number of Outreach Attempt   3/3/2025   3:17 PM 1       Left message on Daughter/Caregiver's voicemail with call back information and requested return call. Voicemail left with assistance from       Plan: Care Coordinator will try to reach patient again in 1-2 business days.    MELIDA Lopez  , Care Coordination  [facility]  [facility]  [phone]

## 2025-03-03 NOTE — PATIENT INSTRUCTIONS
Blood work today  Use debrox  5 drops each ear 2x/day for 7 days  Then make nurse visit to rinse ears  Use Voltaren for back pain  Return in 2 wks for CXR  See me in Sept for annual exam  Social service will contact you   Take vitamin D daily  2000U/day  Take your diabetic medicine         Thank you for trusting us with your care!   Please be aware, if you are on Mychart, you may see your results prior to your providers review. If labs are abnormal, we will call or message you on Mychart with a follow up plan.    If you need to contact us for questions about:  Symptoms, Scheduling & Medical Questions; Non-urgent (2-3 day response) [employer] message, Urgent (needing response today) [phone] (if after 3:30pm next day response)   Prescriptions: Please call your Pharmacy   Billing: Kike [phone] or [facility]:[phone]

## 2025-03-04 NOTE — PROGRESS NOTES
Clinic Care Coordination Contact  [facility]/Voicemail    Clinical Data: Care Coordinator Outreach    Outreach Documentation Number of Outreach Attempt   3/3/2025   3:17 PM 1   3/4/2025   2:48 PM 2       Left message on Daughter/Caregiver's voicemail with call back information and requested return call.      Plan:Care Coordinator will try to reach patient again in 3-5 business days.    MELIDA Lopez  , Care Coordination  [facility]  [facility]  [phone]

## 2025-03-06 NOTE — PROGRESS NOTES
Clinic Care Coordination Contact  Clinic Care Coordination Contact  OUTREACH    Referral Information:  Referral Source: Specialist         Chief Complaint   Patient presents with    Chart Review Please        Universal Utilization: Abdoul[facility] and is followed by Dr. Michelle Sorenson     Utilization      No Show Count (past year)  0             ED Visits  0             Hospital Admissions  0                    Current as of: 3/6/2025  1:30 AM                Assessment: MARLO ROMERO spoke to patient's daughter/caregiver Holly. Daughter reports things are going okay for her mother. She is currently living alone in her home and [facility] Patient has new diagnosis of diabetes and heart failure. Daughter reports being surprised that patient is now diabetic and expressed lack of support in preventing diabetes diagnosis. Patient is possibly interested in switching insurance, but daughter stated this is not a top priority at this time. Daughter would like to call back in one month with patient to have 3 way call to discuss any potential resource needs. MARLO ROMERO provided education on FRW team, daughter is open to referral, but would like to address this next month. No additional needs identified today.   Clinical Concerns:  Current Medical/ Behavioral Concerns:   Patient Active Problem List   Diagnosis    Chronic headache    Gastroesophageal reflux disease    Hyperlipidemia    Urinary incontinence    Vitamin D deficiency    DDD (degenerative disc disease), lumbar    Osteoarthritis of spine with radiculopathy, thoracolumbar region    Depressed mood    LVH (left ventricular hypertrophy)         Education Provided to patient:   MARLO ROMERO called and spoke with Patient's daughter; introduced self, discussed role of Care Coordination, and explained reason for call; utilization concerns. MARLO ROMERO provided additional information on FRW team as well.           Health Maintenance  Reviewed:    Clinical Pathway: None    Medication Management:  Medication review status: medications not reviewed      Lifestyle & Psychosocial Needs:    Social Drivers of Health     Food Insecurity: Not on file   Depression: Not at risk (3/3/2025)    PHQ-2     PHQ-2 Score: 0   Housing Stability: Not on file   Tobacco Use: Low Risk  (3/3/2025)    Patient History     Smoking Tobacco Use: Never     Smokeless Tobacco Use: Never     Passive Exposure: Not on file   Financial Resource Strain: Not on file   Alcohol Use: Not At Risk (6/26/2019)    AUDIT-C     Frequency of Alcohol Consumption: Never     Average Number of Drinks: Not on file     Frequency of Binge Drinking: Never   Transportation Needs: Not on file   Physical Activity: Not on file   Interpersonal Safety: Not on file   Stress: Not on file   Social Connections: Not on file   Health Literacy: Not on file        Resources and Interventions:  Current Resources: Elderly waiver     Patient/Caregiver understanding: daughter  reports understanding and denies any additional questions or concerns at this times.  MARLO ROMERO engaged in [employer] communication during encounter.      Plan: MARLO ROMERO encouraged patient/caregiver to call with questions or concerns as they arise  MARLO ROMERO to completed next outreach on 4.11.25.       MELIDA Lopez  , Care Coordination  [facility]  [facility]  [phone]

## 2025-03-15 ENCOUNTER — HEALTH MAINTENANCE LETTER (OUTPATIENT)
Age: 85
End: 2025-03-15

## 2025-06-28 ENCOUNTER — HEALTH MAINTENANCE LETTER (OUTPATIENT)
Age: 85
End: 2025-06-28

## 2025-07-29 ENCOUNTER — TELEPHONE (OUTPATIENT)
Dept: FAMILY MEDICINE | Facility: CLINIC | Age: 85
End: 2025-07-29

## 2025-07-29 NOTE — TELEPHONE ENCOUNTER
Received Special Diet Instructions     Placed form in Kelly's mailbox for Dr. Hernandez  to review

## 2025-08-05 ENCOUNTER — TELEPHONE (OUTPATIENT)
Dept: OBGYN | Facility: CLINIC | Age: 85
End: 2025-08-05
Payer: MEDICARE

## 2025-08-13 ENCOUNTER — HOSPITAL ENCOUNTER (EMERGENCY)
Facility: CLINIC | Age: 85
Discharge: HOME | End: 2025-08-13
Attending: STUDENT IN AN ORGANIZED HEALTH CARE EDUCATION/TRAINING PROGRAM
Payer: MEDICARE

## 2025-08-13 ENCOUNTER — TELEPHONE (OUTPATIENT)
Dept: OPHTHALMOLOGY | Facility: CLINIC | Age: 85
End: 2025-08-13

## 2025-08-13 ENCOUNTER — OFFICE VISIT (OUTPATIENT)
Dept: OPHTHALMOLOGY | Facility: CLINIC | Age: 85
End: 2025-08-13
Attending: OPHTHALMOLOGY
Payer: MEDICARE

## 2025-08-13 ENCOUNTER — APPOINTMENT (OUTPATIENT)
Dept: CT IMAGING | Facility: CLINIC | Age: 85
End: 2025-08-13
Attending: STUDENT IN AN ORGANIZED HEALTH CARE EDUCATION/TRAINING PROGRAM
Payer: MEDICARE

## 2025-08-13 VITALS
TEMPERATURE: 97.5 F | DIASTOLIC BLOOD PRESSURE: 73 MMHG | OXYGEN SATURATION: 99 % | RESPIRATION RATE: 16 BRPM | HEART RATE: 71 BPM | SYSTOLIC BLOOD PRESSURE: 137 MMHG

## 2025-08-13 DIAGNOSIS — H57.89 REDNESS OF LEFT EYE: Primary | ICD-10-CM

## 2025-08-13 DIAGNOSIS — H16.009 PERIPHERAL ULCERATIVE KERATITIS: Primary | ICD-10-CM

## 2025-08-13 LAB
ANION GAP SERPL CALCULATED.3IONS-SCNC: 10 MMOL/L (ref 7–15)
BASOPHILS # BLD AUTO: 0.03 10E3/UL (ref 0–0.2)
BASOPHILS NFR BLD AUTO: 0.5 %
BUN SERPL-MCNC: 11.2 MG/DL (ref 8–23)
CALCIUM SERPL-MCNC: 9.3 MG/DL (ref 8.8–10.4)
CHLORIDE SERPL-SCNC: 104 MMOL/L (ref 98–107)
CREAT SERPL-MCNC: 0.56 MG/DL (ref 0.51–0.95)
EGFRCR SERPLBLD CKD-EPI 2021: 88 ML/MIN/1.73M2
EOSINOPHIL # BLD AUTO: 0.11 10E3/UL (ref 0–0.7)
EOSINOPHIL NFR BLD AUTO: 1.9 %
ERYTHROCYTE [DISTWIDTH] IN BLOOD BY AUTOMATED COUNT: 12.1 % (ref 10–15)
GLUCOSE SERPL-MCNC: 97 MG/DL (ref 70–99)
HCO3 SERPL-SCNC: 26 MMOL/L (ref 22–29)
HCT VFR BLD AUTO: 34.4 % (ref 35–47)
HGB BLD-MCNC: 11.4 G/DL (ref 11.7–15.7)
HOLD SPECIMEN: NORMAL
IMM GRANULOCYTES # BLD: 0.02 10E3/UL
IMM GRANULOCYTES NFR BLD: 0.4 %
LYMPHOCYTES # BLD AUTO: 0.96 10E3/UL (ref 0.8–5.3)
LYMPHOCYTES NFR BLD AUTO: 16.8 %
MCH RBC QN AUTO: 31.6 PG (ref 26.5–33)
MCHC RBC AUTO-ENTMCNC: 33.1 G/DL (ref 31.5–36.5)
MCV RBC AUTO: 95.3 FL (ref 78–100)
MONOCYTES # BLD AUTO: 0.37 10E3/UL (ref 0–1.3)
MONOCYTES NFR BLD AUTO: 6.5 %
NEUTROPHILS # BLD AUTO: 4.22 10E3/UL (ref 1.6–8.3)
NEUTROPHILS NFR BLD AUTO: 73.9 %
NRBC # BLD AUTO: 0 10E3/UL
NRBC BLD AUTO-RTO: 0 /100
PLATELET # BLD AUTO: 217 10E3/UL (ref 150–450)
POTASSIUM SERPL-SCNC: 3.9 MMOL/L (ref 3.4–5.3)
RBC # BLD AUTO: 3.61 10E6/UL (ref 3.8–5.2)
SODIUM SERPL-SCNC: 140 MMOL/L (ref 135–145)
WBC # BLD AUTO: 5.71 10E3/UL (ref 4–11)

## 2025-08-13 PROCEDURE — 80048 BASIC METABOLIC PNL TOTAL CA: CPT | Performed by: STUDENT IN AN ORGANIZED HEALTH CARE EDUCATION/TRAINING PROGRAM

## 2025-08-13 PROCEDURE — 85004 AUTOMATED DIFF WBC COUNT: CPT | Performed by: STUDENT IN AN ORGANIZED HEALTH CARE EDUCATION/TRAINING PROGRAM

## 2025-08-13 PROCEDURE — 250N000011 HC RX IP 250 OP 636: Performed by: STUDENT IN AN ORGANIZED HEALTH CARE EDUCATION/TRAINING PROGRAM

## 2025-08-13 PROCEDURE — 36415 COLL VENOUS BLD VENIPUNCTURE: CPT | Performed by: STUDENT IN AN ORGANIZED HEALTH CARE EDUCATION/TRAINING PROGRAM

## 2025-08-13 PROCEDURE — 99285 EMERGENCY DEPT VISIT HI MDM: CPT | Mod: 25 | Performed by: STUDENT IN AN ORGANIZED HEALTH CARE EDUCATION/TRAINING PROGRAM

## 2025-08-13 PROCEDURE — 250N000013 HC RX MED GY IP 250 OP 250 PS 637: Performed by: STUDENT IN AN ORGANIZED HEALTH CARE EDUCATION/TRAINING PROGRAM

## 2025-08-13 PROCEDURE — 250N000009 HC RX 250: Performed by: STUDENT IN AN ORGANIZED HEALTH CARE EDUCATION/TRAINING PROGRAM

## 2025-08-13 PROCEDURE — 70481 CT ORBIT/EAR/FOSSA W/DYE: CPT

## 2025-08-13 RX ORDER — METHYLPREDNISOLONE 4 MG/1
TABLET ORAL
Qty: 21 TABLET | Refills: 0 | Status: SHIPPED | OUTPATIENT
Start: 2025-08-13

## 2025-08-13 RX ORDER — OFLOXACIN 3 MG/ML
1 SOLUTION/ DROPS OPHTHALMIC 4 TIMES DAILY
COMMUNITY
Start: 2025-07-31

## 2025-08-13 RX ORDER — OXYCODONE HYDROCHLORIDE 5 MG/1
5 TABLET ORAL ONCE
Refills: 0 | Status: COMPLETED | OUTPATIENT
Start: 2025-08-13 | End: 2025-08-13

## 2025-08-13 RX ORDER — IOPAMIDOL 755 MG/ML
500 INJECTION, SOLUTION INTRAVASCULAR ONCE
Status: COMPLETED | OUTPATIENT
Start: 2025-08-13 | End: 2025-08-13

## 2025-08-13 RX ORDER — TETRACAINE HYDROCHLORIDE 5 MG/ML
2 SOLUTION OPHTHALMIC ONCE
Status: COMPLETED | OUTPATIENT
Start: 2025-08-13 | End: 2025-08-13

## 2025-08-13 RX ORDER — PREDNISOLONE ACETATE 10 MG/ML
1-2 SUSPENSION/ DROPS OPHTHALMIC 4 TIMES DAILY
Qty: 5 ML | Refills: 0 | Status: SHIPPED | OUTPATIENT
Start: 2025-08-13

## 2025-08-13 RX ORDER — METHYLPREDNISOLONE 4 MG/1
TABLET ORAL
Qty: 21 TABLET | Refills: 0 | Status: SHIPPED | OUTPATIENT
Start: 2025-08-13 | End: 2025-08-13

## 2025-08-13 RX ORDER — METHYLPREDNISOLONE 4 MG/1
16 TABLET ORAL 2 TIMES DAILY
Qty: 21 TABLET | Refills: 0 | Status: SHIPPED | OUTPATIENT
Start: 2025-08-13 | End: 2025-08-13

## 2025-08-13 RX ORDER — MOXIFLOXACIN 5 MG/ML
1 SOLUTION/ DROPS OPHTHALMIC 4 TIMES DAILY
Qty: 3 ML | Refills: 1 | Status: SHIPPED | OUTPATIENT
Start: 2025-08-13

## 2025-08-13 RX ORDER — FLUORESCEIN SODIUM 1 MG/MG
2 STRIP OPHTHALMIC ONCE
Status: COMPLETED | OUTPATIENT
Start: 2025-08-13 | End: 2025-08-13

## 2025-08-13 RX ADMIN — OXYCODONE HYDROCHLORIDE 5 MG: 5 TABLET ORAL at 11:09

## 2025-08-13 RX ADMIN — SODIUM CHLORIDE 60 ML: 9 INJECTION, SOLUTION INTRAVENOUS at 09:26

## 2025-08-13 RX ADMIN — TETRACAINE HYDROCHLORIDE 2 DROP: 5 SOLUTION OPHTHALMIC at 08:19

## 2025-08-13 RX ADMIN — FLUORESCEIN SODIUM 2 STRIP: 1 STRIP OPHTHALMIC at 08:18

## 2025-08-13 RX ADMIN — IOPAMIDOL 67 ML: 755 INJECTION, SOLUTION INTRAVENOUS at 09:26

## 2025-08-13 ASSESSMENT — VISUAL ACUITY
OS: 20/60
OD: 20/60
OD: 20/60
METHOD: SNELLEN - LINEAR
OU: 20/60

## 2025-08-13 ASSESSMENT — TONOMETRY
OS_IOP_MMHG: 7
IOP_METHOD: ICARE
OD_IOP_MMHG: 10

## 2025-08-13 ASSESSMENT — EXTERNAL EXAM - RIGHT EYE: OD_EXAM: NORMAL

## 2025-08-13 ASSESSMENT — ACTIVITIES OF DAILY LIVING (ADL)
ADLS_ACUITY_SCORE: 41

## 2025-08-13 ASSESSMENT — SLIT LAMP EXAM - LIDS
COMMENTS: MGD, BLEPHARITIS
COMMENTS: MGD, BLEPHARITIS

## 2025-08-13 ASSESSMENT — EXTERNAL EXAM - LEFT EYE: OS_EXAM: NORMAL

## 2025-08-13 ASSESSMENT — COLUMBIA-SUICIDE SEVERITY RATING SCALE - C-SSRS
6. HAVE YOU EVER DONE ANYTHING, STARTED TO DO ANYTHING, OR PREPARED TO DO ANYTHING TO END YOUR LIFE?: NO
1. IN THE PAST MONTH, HAVE YOU WISHED YOU WERE DEAD OR WISHED YOU COULD GO TO SLEEP AND NOT WAKE UP?: NO
2. HAVE YOU ACTUALLY HAD ANY THOUGHTS OF KILLING YOURSELF IN THE PAST MONTH?: NO

## 2025-08-20 ENCOUNTER — OFFICE VISIT (OUTPATIENT)
Dept: OPHTHALMOLOGY | Facility: CLINIC | Age: 85
End: 2025-08-20
Attending: OPHTHALMOLOGY
Payer: MEDICARE

## 2025-08-20 ENCOUNTER — LAB (OUTPATIENT)
Dept: LAB | Facility: CLINIC | Age: 85
End: 2025-08-20
Payer: MEDICARE

## 2025-08-20 DIAGNOSIS — H16.009 PERIPHERAL ULCERATIVE KERATITIS: ICD-10-CM

## 2025-08-20 DIAGNOSIS — E11.9 TYPE 2 DIABETES MELLITUS WITHOUT COMPLICATION, WITHOUT LONG-TERM CURRENT USE OF INSULIN (H): ICD-10-CM

## 2025-08-20 DIAGNOSIS — H16.009 PERIPHERAL ULCERATIVE KERATITIS: Primary | ICD-10-CM

## 2025-08-20 DIAGNOSIS — Z87.440 RECENT URINARY TRACT INFECTION: ICD-10-CM

## 2025-08-20 LAB
ANION GAP SERPL CALCULATED.3IONS-SCNC: 11 MMOL/L (ref 7–15)
BASOPHILS # BLD AUTO: 0.05 10E3/UL (ref 0–0.2)
BASOPHILS NFR BLD AUTO: 0.7 %
BUN SERPL-MCNC: 14.2 MG/DL (ref 8–23)
CALCIUM SERPL-MCNC: 9.2 MG/DL (ref 8.8–10.4)
CHLORIDE SERPL-SCNC: 103 MMOL/L (ref 98–107)
CREAT SERPL-MCNC: 0.56 MG/DL (ref 0.51–0.95)
EGFRCR SERPLBLD CKD-EPI 2021: 88 ML/MIN/1.73M2
EOSINOPHIL # BLD AUTO: 0.15 10E3/UL (ref 0–0.7)
EOSINOPHIL NFR BLD AUTO: 2.2 %
ERYTHROCYTE [DISTWIDTH] IN BLOOD BY AUTOMATED COUNT: 12.5 % (ref 10–15)
ERYTHROCYTE [SEDIMENTATION RATE] IN BLOOD BY WESTERGREN METHOD: 30 MM/HR (ref 0–30)
EST. AVERAGE GLUCOSE BLD GHB EST-MCNC: 111 MG/DL
GLUCOSE SERPL-MCNC: 96 MG/DL (ref 70–99)
HBA1C MFR BLD: 5.5 %
HCO3 SERPL-SCNC: 28 MMOL/L (ref 22–29)
HCT VFR BLD AUTO: 36.3 % (ref 35–47)
HGB BLD-MCNC: 11.5 G/DL (ref 11.7–15.7)
IMM GRANULOCYTES # BLD: 0.03 10E3/UL
IMM GRANULOCYTES NFR BLD: 0.4 %
LYMPHOCYTES # BLD AUTO: 1.43 10E3/UL (ref 0.8–5.3)
LYMPHOCYTES NFR BLD AUTO: 21.4 %
MCH RBC QN AUTO: 31.3 PG (ref 26.5–33)
MCHC RBC AUTO-ENTMCNC: 31.7 G/DL (ref 31.5–36.5)
MCV RBC AUTO: 98.6 FL (ref 78–100)
MONOCYTES # BLD AUTO: 0.51 10E3/UL (ref 0–1.3)
MONOCYTES NFR BLD AUTO: 7.6 %
NEUTROPHILS # BLD AUTO: 4.52 10E3/UL (ref 1.6–8.3)
NEUTROPHILS NFR BLD AUTO: 67.7 %
NRBC # BLD AUTO: <0.03 10E3/UL
NRBC BLD AUTO-RTO: 0 /100
PLATELET # BLD AUTO: 246 10E3/UL (ref 150–450)
POTASSIUM SERPL-SCNC: 4.1 MMOL/L (ref 3.4–5.3)
RBC # BLD AUTO: 3.68 10E6/UL (ref 3.8–5.2)
RHEUMATOID FACT SERPL-ACNC: <10 IU/ML
SODIUM SERPL-SCNC: 142 MMOL/L (ref 135–145)
WBC # BLD AUTO: 6.69 10E3/UL (ref 4–11)

## 2025-08-20 PROCEDURE — G0463 HOSPITAL OUTPT CLINIC VISIT: HCPCS | Performed by: OPHTHALMOLOGY

## 2025-08-20 PROCEDURE — 99213 OFFICE O/P EST LOW 20 MIN: CPT | Mod: GC | Performed by: OPHTHALMOLOGY

## 2025-08-20 PROCEDURE — 86038 ANTINUCLEAR ANTIBODIES: CPT

## 2025-08-20 PROCEDURE — 83036 HEMOGLOBIN GLYCOSYLATED A1C: CPT | Performed by: FAMILY MEDICINE

## 2025-08-20 PROCEDURE — G2211 COMPLEX E/M VISIT ADD ON: HCPCS | Performed by: OPHTHALMOLOGY

## 2025-08-20 PROCEDURE — 86431 RHEUMATOID FACTOR QUANT: CPT

## 2025-08-20 PROCEDURE — 86481 TB AG RESPONSE T-CELL SUSP: CPT

## 2025-08-20 PROCEDURE — 86036 ANCA SCREEN EACH ANTIBODY: CPT

## 2025-08-20 ASSESSMENT — CONF VISUAL FIELD
OD_NORMAL: 1
OS_SUPERIOR_NASAL_RESTRICTION: 0
OD_SUPERIOR_TEMPORAL_RESTRICTION: 0
OD_INFERIOR_TEMPORAL_RESTRICTION: 0
METHOD: COUNTING FINGERS
OS_NORMAL: 1
OS_INFERIOR_NASAL_RESTRICTION: 0
OS_SUPERIOR_TEMPORAL_RESTRICTION: 0
OD_INFERIOR_NASAL_RESTRICTION: 0
OD_SUPERIOR_NASAL_RESTRICTION: 0
OS_INFERIOR_TEMPORAL_RESTRICTION: 0

## 2025-08-20 ASSESSMENT — VISUAL ACUITY
OS_PH_CC: 20/60
OS_CC: 20/80
OD_PH_SC: 20/60
CORRECTION_TYPE: CONTACTS
OD_SC: 20/100
METHOD: NUMBERS - SINGLE

## 2025-08-20 ASSESSMENT — EXTERNAL EXAM - LEFT EYE: OS_EXAM: NORMAL

## 2025-08-20 ASSESSMENT — TONOMETRY
IOP_METHOD: ICARE
OS_IOP_MMHG: 10
OD_IOP_MMHG: 11

## 2025-08-20 ASSESSMENT — EXTERNAL EXAM - RIGHT EYE: OD_EXAM: NORMAL

## 2025-08-20 ASSESSMENT — SLIT LAMP EXAM - LIDS
COMMENTS: MGD, BLEPHARITIS
COMMENTS: MGD, BLEPHARITIS

## 2025-08-21 LAB
ANA SER QL IF: NEGATIVE
ANCA AB PATTERN SER IF-IMP: NORMAL
C-ANCA TITR SER IF: NORMAL {TITER}
GAMMA INTERFERON BACKGROUND BLD IA-ACNC: 0.1 IU/ML
M TB IFN-G BLD-IMP: NEGATIVE
M TB IFN-G CD4+ BCKGRND COR BLD-ACNC: 9.9 IU/ML
MITOGEN IGNF BCKGRD COR BLD-ACNC: -0.03 IU/ML
MITOGEN IGNF BCKGRD COR BLD-ACNC: 0.01 IU/ML
QUANTIFERON MITOGEN: 10 IU/ML
QUANTIFERON NIL TUBE: 0.1 IU/ML
QUANTIFERON TB1 TUBE: 0.11 IU/ML
QUANTIFERON TB2 TUBE: 0.07